# Patient Record
Sex: MALE | Race: WHITE | NOT HISPANIC OR LATINO | URBAN - METROPOLITAN AREA
[De-identification: names, ages, dates, MRNs, and addresses within clinical notes are randomized per-mention and may not be internally consistent; named-entity substitution may affect disease eponyms.]

---

## 2018-05-24 ENCOUNTER — APPOINTMENT (OUTPATIENT)
Dept: URBAN - METROPOLITAN AREA CLINIC 200 | Age: 63
Setting detail: DERMATOLOGY
End: 2018-06-05

## 2018-05-24 DIAGNOSIS — L57.0 ACTINIC KERATOSIS: ICD-10-CM

## 2018-05-24 PROCEDURE — OTHER LIQUID NITROGEN: OTHER

## 2018-05-24 PROCEDURE — 17000 DESTRUCT PREMALG LESION: CPT

## 2018-05-24 ASSESSMENT — LOCATION DETAILED DESCRIPTION DERM: LOCATION DETAILED: NASAL DORSUM

## 2018-05-24 ASSESSMENT — LOCATION SIMPLE DESCRIPTION DERM: LOCATION SIMPLE: NOSE

## 2018-05-24 ASSESSMENT — PAIN INTENSITY VAS: HOW INTENSE IS YOUR PAIN 0 BEING NO PAIN, 10 BEING THE MOST SEVERE PAIN POSSIBLE?: NO PAIN

## 2018-05-24 ASSESSMENT — LOCATION ZONE DERM: LOCATION ZONE: NOSE

## 2018-07-30 ENCOUNTER — TELEPHONE (OUTPATIENT)
Dept: FAMILY MEDICINE | Facility: CLINIC | Age: 63
End: 2018-07-30

## 2018-08-29 ENCOUNTER — APPOINTMENT (OUTPATIENT)
Dept: URBAN - METROPOLITAN AREA CLINIC 200 | Age: 63
Setting detail: DERMATOLOGY
End: 2018-09-11

## 2018-08-29 DIAGNOSIS — L57.0 ACTINIC KERATOSIS: ICD-10-CM

## 2018-08-29 PROCEDURE — 17000 DESTRUCT PREMALG LESION: CPT

## 2018-08-29 PROCEDURE — OTHER LIQUID NITROGEN: OTHER

## 2018-08-29 ASSESSMENT — LOCATION DETAILED DESCRIPTION DERM: LOCATION DETAILED: NASAL DORSUM

## 2018-08-29 ASSESSMENT — LOCATION ZONE DERM: LOCATION ZONE: NOSE

## 2018-08-29 ASSESSMENT — PAIN INTENSITY VAS: HOW INTENSE IS YOUR PAIN 0 BEING NO PAIN, 10 BEING THE MOST SEVERE PAIN POSSIBLE?: NO PAIN

## 2018-08-29 ASSESSMENT — LOCATION SIMPLE DESCRIPTION DERM: LOCATION SIMPLE: NOSE

## 2018-11-19 ENCOUNTER — APPOINTMENT (OUTPATIENT)
Dept: URBAN - METROPOLITAN AREA CLINIC 200 | Age: 63
Setting detail: DERMATOLOGY
End: 2018-11-20

## 2018-11-19 DIAGNOSIS — L91.8 OTHER HYPERTROPHIC DISORDERS OF THE SKIN: ICD-10-CM

## 2018-11-19 DIAGNOSIS — L57.8 OTHER SKIN CHANGES DUE TO CHRONIC EXPOSURE TO NONIONIZING RADIATION: ICD-10-CM

## 2018-11-19 PROCEDURE — 99213 OFFICE O/P EST LOW 20 MIN: CPT

## 2018-11-19 PROCEDURE — OTHER MIPS QUALITY: OTHER

## 2018-11-19 PROCEDURE — OTHER COUNSELING: OTHER

## 2018-11-19 ASSESSMENT — LOCATION ZONE DERM
LOCATION ZONE: TRUNK
LOCATION ZONE: AXILLAE

## 2018-11-19 ASSESSMENT — LOCATION SIMPLE DESCRIPTION DERM
LOCATION SIMPLE: CHEST
LOCATION SIMPLE: LEFT AXILLARY VAULT

## 2018-11-19 ASSESSMENT — LOCATION DETAILED DESCRIPTION DERM
LOCATION DETAILED: LEFT MEDIAL SUPERIOR CHEST
LOCATION DETAILED: LEFT AXILLARY VAULT

## 2018-11-30 ENCOUNTER — HOSPITAL ENCOUNTER (OUTPATIENT)
Dept: CARDIOLOGY | Facility: HOSPITAL | Age: 63
Discharge: HOME | End: 2018-11-30
Attending: ORTHOPAEDIC SURGERY
Payer: COMMERCIAL

## 2018-11-30 ENCOUNTER — TRANSCRIBE ORDERS (OUTPATIENT)
Dept: CARDIOLOGY | Facility: HOSPITAL | Age: 63
End: 2018-11-30

## 2018-11-30 ENCOUNTER — APPOINTMENT (OUTPATIENT)
Dept: LAB | Facility: HOSPITAL | Age: 63
End: 2018-11-30
Attending: ORTHOPAEDIC SURGERY
Payer: COMMERCIAL

## 2018-11-30 ENCOUNTER — TRANSCRIBE ORDERS (OUTPATIENT)
Dept: LAB | Facility: HOSPITAL | Age: 63
End: 2018-11-30

## 2018-11-30 DIAGNOSIS — M66.312 SPONTANEOUS RUPTURE OF FLEXOR TENDON OF LEFT SHOULDER: ICD-10-CM

## 2018-11-30 DIAGNOSIS — Z01.818 ENCOUNTER FOR OTHER PREPROCEDURAL EXAMINATION: Primary | ICD-10-CM

## 2018-11-30 DIAGNOSIS — Z01.818 ENCOUNTER FOR OTHER PREPROCEDURAL EXAMINATION: ICD-10-CM

## 2018-11-30 DIAGNOSIS — M66.312 SPONTANEOUS RUPTURE OF FLEXOR TENDON OF LEFT SHOULDER: Primary | ICD-10-CM

## 2018-11-30 LAB
ANION GAP SERPL CALC-SCNC: 11 MEQ/L (ref 3–15)
BUN SERPL-MCNC: 12 MG/DL (ref 8–20)
CALCIUM SERPL-MCNC: 9.7 MG/DL (ref 8.9–10.3)
CHLORIDE SERPL-SCNC: 101 MEQ/L (ref 98–109)
CO2 SERPL-SCNC: 27 MEQ/L (ref 22–32)
CREAT SERPL-MCNC: 0.8 MG/DL
ERYTHROCYTE [DISTWIDTH] IN BLOOD BY AUTOMATED COUNT: 13 % (ref 11.6–14.4)
GFR SERPL CREATININE-BSD FRML MDRD: >60 ML/MIN/1.73M*2
GLUCOSE SERPL-MCNC: 101 MG/DL (ref 70–99)
HCT VFR BLDCO AUTO: 41.3 %
HGB BLD-MCNC: 14 G/DL
MCH RBC QN AUTO: 31.6 PG (ref 28–33.2)
MCHC RBC AUTO-ENTMCNC: 33.9 G/DL (ref 32.2–36.5)
MCV RBC AUTO: 93.2 FL (ref 83–98)
PDW BLD AUTO: 9 FL (ref 9.4–12.4)
PLATELET # BLD AUTO: 322 K/UL
POTASSIUM SERPL-SCNC: 4.5 MEQ/L (ref 3.6–5.1)
RBC # BLD AUTO: 4.43 M/UL (ref 4.5–5.8)
SODIUM SERPL-SCNC: 139 MEQ/L (ref 136–144)
WBC # BLD AUTO: 7.15 K/UL

## 2018-11-30 PROCEDURE — 80048 BASIC METABOLIC PNL TOTAL CA: CPT

## 2018-11-30 PROCEDURE — 36415 COLL VENOUS BLD VENIPUNCTURE: CPT

## 2018-11-30 PROCEDURE — 85027 COMPLETE CBC AUTOMATED: CPT

## 2018-11-30 PROCEDURE — 93005 ELECTROCARDIOGRAM TRACING: CPT

## 2018-12-01 LAB
ATRIAL RATE: 61
P AXIS: 45
PR INTERVAL: 158
QRS DURATION: 110
QT INTERVAL: 400
QTC CALCULATION(BAZETT): 402
R AXIS: -43
T WAVE AXIS: 39
VENTRICULAR RATE: 61

## 2018-12-01 PROCEDURE — 93010 ELECTROCARDIOGRAM REPORT: CPT | Performed by: INTERNAL MEDICINE

## 2018-12-09 PROBLEM — Z87.442 HISTORY OF KIDNEY STONES: Status: ACTIVE | Noted: 2017-05-18

## 2018-12-09 PROBLEM — K21.9 GASTROESOPHAGEAL REFLUX DISEASE: Status: ACTIVE | Noted: 2018-12-09

## 2018-12-09 PROBLEM — F90.0 ADHD (ATTENTION DEFICIT HYPERACTIVITY DISORDER), INATTENTIVE TYPE: Status: ACTIVE | Noted: 2018-12-09

## 2018-12-09 PROBLEM — E78.00 PURE HYPERCHOLESTEROLEMIA: Status: ACTIVE | Noted: 2018-12-09

## 2018-12-10 ENCOUNTER — TELEPHONE (OUTPATIENT)
Dept: FAMILY MEDICINE | Facility: CLINIC | Age: 63
End: 2018-12-10

## 2018-12-10 ENCOUNTER — CONSULT (OUTPATIENT)
Dept: FAMILY MEDICINE | Facility: CLINIC | Age: 63
End: 2018-12-10
Payer: COMMERCIAL

## 2018-12-10 VITALS
BODY MASS INDEX: 23.23 KG/M2 | WEIGHT: 148 LBS | HEIGHT: 67 IN | DIASTOLIC BLOOD PRESSURE: 70 MMHG | RESPIRATION RATE: 12 BRPM | HEART RATE: 74 BPM | SYSTOLIC BLOOD PRESSURE: 110 MMHG

## 2018-12-10 DIAGNOSIS — Z01.818 PREOPERATIVE EXAMINATION: Primary | ICD-10-CM

## 2018-12-10 DIAGNOSIS — I44.4 LEFT ANTERIOR FASCICULAR BLOCK: ICD-10-CM

## 2018-12-10 DIAGNOSIS — F90.0 ADHD (ATTENTION DEFICIT HYPERACTIVITY DISORDER), INATTENTIVE TYPE: ICD-10-CM

## 2018-12-10 DIAGNOSIS — E78.00 PURE HYPERCHOLESTEROLEMIA: ICD-10-CM

## 2018-12-10 DIAGNOSIS — S46.219A BICEPS TENDON TEAR: ICD-10-CM

## 2018-12-10 PROCEDURE — 99243 OFF/OP CNSLTJ NEW/EST LOW 30: CPT | Performed by: FAMILY MEDICINE

## 2018-12-10 RX ORDER — ALPRAZOLAM 0.25 MG/1
TABLET ORAL
COMMUNITY
Start: 2018-11-06

## 2018-12-10 RX ORDER — METHYLPHENIDATE HYDROCHLORIDE 10 MG/1
TABLET ORAL
COMMUNITY
Start: 2018-11-06 | End: 2021-08-31

## 2018-12-10 RX ORDER — ZOLPIDEM TARTRATE 10 MG/1
10 TABLET ORAL
COMMUNITY
Start: 2018-11-06 | End: 2021-08-31

## 2018-12-10 RX ORDER — TADALAFIL 20 MG/1
TABLET, FILM COATED ORAL
COMMUNITY
Start: 2018-11-02

## 2018-12-10 ASSESSMENT — ENCOUNTER SYMPTOMS
NAUSEA: 0
ABDOMINAL PAIN: 0
NERVOUS/ANXIOUS: 0
APNEA: 0
CHEST TIGHTNESS: 0
BLOOD IN STOOL: 0
FEVER: 0
VOMITING: 0
SLEEP DISTURBANCE: 0
LIGHT-HEADEDNESS: 0
CHILLS: 0
SHORTNESS OF BREATH: 0
DIZZINESS: 0
PALPITATIONS: 0

## 2018-12-10 NOTE — ASSESSMENT & PLAN NOTE
-EKG reviewed and essentially unchanged from 9/2017  -Only manifestation is leftward axis at -43 (-47 last year)  -Likely benign consequence of aging

## 2018-12-10 NOTE — PROGRESS NOTES
"Subjective      Patient ID: Juan Crespo is a 63 y.o. male.  1955      Danny is here for preoperative assessment    - Bicep Tendon Rupture L shoulder s/p injury 6 wks ago   - 12/14/18 with Dr Dunn  - [facility]   - NKDA, NKFA  - prior surgeries: Cartiva on R great toe, tonsillectomy, and L inguinal hernia repair in 20s- no prior anaesthesia reactions  -Only family hx of CAD is mother and father with MI and stents respectively in their 80s  -Denies any chest pain, shortness of breath, dizziness, syncope, vomiting, diaphoresis in the setting of 30-60 minutes of circuit training prior to onset of above sx.   - had EKG (unchanged) and normal BMP and CBC last wk         The following have been reviewed and updated as appropriate in this visit:  Tobacco  Med Hx  Surg Hx  Fam Hx  Soc Hx      Review of Systems   Constitutional: Negative for chills and fever.   Respiratory: Negative for apnea, chest tightness and shortness of breath.         Denies snoring   Cardiovascular: Negative for chest pain, palpitations and leg swelling.   Gastrointestinal: Negative for abdominal pain, blood in stool, nausea and vomiting.   Musculoskeletal: Negative for gait problem.   Skin: Negative for rash.   Neurological: Negative for dizziness, syncope and light-headedness.   Psychiatric/Behavioral: Negative for sleep disturbance (on prn ambien 10 mg). The patient is not nervous/anxious (controlled per psychiatrist).        Objective     Vitals:    12/10/18 1536 12/10/18 1603   BP: 130/72 110/70   BP Location:  Left upper arm   Patient Position:  Sitting   Pulse: 74    Resp: 12    Weight: 67.1 kg (148 lb)    Height: 1.689 m (5' 6.5\")      Body mass index is 23.53 kg/m².    Physical Exam   Constitutional: He is oriented to person, place, and time. He appears well-developed and well-nourished. No distress.   HENT:   Head: Normocephalic and atraumatic.   Mouth/Throat: Oropharynx is clear and moist. No oropharyngeal exudate. " "  Eyes: Pupils are equal, round, and reactive to light. No scleral icterus.   Cardiovascular: Normal rate, regular rhythm and normal heart sounds.  Exam reveals no gallop and no friction rub.    No murmur heard.  Pulmonary/Chest: Effort normal and breath sounds normal. No respiratory distress. He has no wheezes. He has no rales.   Abdominal: Soft. Bowel sounds are normal. He exhibits no distension and no mass. There is no tenderness. There is no rebound and no guarding.   Musculoskeletal: He exhibits no edema.   \"Parish\" deformity of left anterior proximal bicep upon flexion.   Neurological: He is alert and oriented to person, place, and time.   Skin: He is not diaphoretic.   Psychiatric: He has a normal mood and affect. His behavior is normal. Judgment and thought content normal.   Pleasant and conversational       Assessment/Plan   Problem List Items Addressed This Visit        Other    ADHD (attention deficit hyperactivity disorder), inattentive type     -Management per psychiatrist         Relevant Medications    ALPRAZolam (XANAX) 0.25 mg tablet    methylphenidate HCl (RITALIN) 10 mg tablet    zolpidem (AMBIEN) 10 mg tablet    Pure hypercholesterolemia     -CVD risk from last year <7.5%  -We will repeat lipids fasting and calculate CVD risk at next visit in 1 month  -No need to check perioperatively given lack of other risk factors         Left anterior fascicular block     -EKG reviewed and essentially unchanged from 9/2017  -Only manifestation is leftward axis at -43 (-47 last year)  -Likely benign consequence of aging           Other Visit Diagnoses     Preoperative examination    -  Primary    No contraindication to planned surgery in 4d as scheduled  CBC, BMP wnl & EKG unchanged  No sx of RADHA  Anaesthesiologist to be aware of prn ambien/bzd use    Biceps tendon tear              "

## 2018-12-10 NOTE — ASSESSMENT & PLAN NOTE
-CVD risk from last year <7.5%  -We will repeat lipids fasting and calculate CVD risk at next visit in 1 month  -No need to check perioperatively given lack of other risk factors

## 2018-12-10 NOTE — PATIENT INSTRUCTIONS
Good luck with the surgery as scheduled on Friday.  I see no contraindication to surgery as planned.  Return in 1 month for fasting labs, including cholesterol, and then 1 week later for wellness visit.  At that time, we will help facilitate repeat colonoscopy.

## 2018-12-12 ENCOUNTER — TELEPHONE (OUTPATIENT)
Dept: FAMILY MEDICINE | Facility: CLINIC | Age: 63
End: 2018-12-12

## 2018-12-21 ENCOUNTER — TELEPHONE (OUTPATIENT)
Dept: FAMILY MEDICINE | Facility: CLINIC | Age: 63
End: 2018-12-21

## 2018-12-21 DIAGNOSIS — F90.0 ADHD (ATTENTION DEFICIT HYPERACTIVITY DISORDER), INATTENTIVE TYPE: ICD-10-CM

## 2018-12-21 DIAGNOSIS — Z12.5 SCREENING FOR PROSTATE CANCER: ICD-10-CM

## 2018-12-21 DIAGNOSIS — E78.00 PURE HYPERCHOLESTEROLEMIA: Primary | ICD-10-CM

## 2018-12-26 NOTE — TELEPHONE ENCOUNTER
Stacy,  Please call patient see message below. He was one that was on diagnostic and is going to lab. He is aware.

## 2019-01-29 ENCOUNTER — TELEPHONE (OUTPATIENT)
Dept: FAMILY MEDICINE | Facility: CLINIC | Age: 64
End: 2019-01-29

## 2019-01-29 LAB
ALBUMIN SERPL-MCNC: 4.4 G/DL (ref 3.6–5.1)
ALBUMIN/GLOB SERPL: 2 (CALC) (ref 1–2.5)
ALP SERPL-CCNC: 53 U/L (ref 40–115)
ALT SERPL-CCNC: 12 U/L (ref 9–46)
AST SERPL-CCNC: 15 U/L (ref 10–35)
BILIRUB SERPL-MCNC: 0.6 MG/DL (ref 0.2–1.2)
BUN SERPL-MCNC: 16 MG/DL (ref 7–25)
BUN/CREAT SERPL: ABNORMAL (CALC) (ref 6–22)
CALCIUM SERPL-MCNC: 9.2 MG/DL (ref 8.6–10.3)
CHLORIDE SERPL-SCNC: 105 MMOL/L (ref 98–110)
CHOLEST SERPL-MCNC: 221 MG/DL
CHOLEST/HDLC SERPL: 2.7 (CALC)
CO2 SERPL-SCNC: 27 MMOL/L (ref 20–32)
CREAT SERPL-MCNC: 0.84 MG/DL (ref 0.7–1.25)
GLOBULIN SER CALC-MCNC: 2.2 G/DL (CALC) (ref 1.9–3.7)
GLUCOSE SERPL-MCNC: 105 MG/DL (ref 65–99)
HDLC SERPL-MCNC: 81 MG/DL
LDLC SERPL CALC-MCNC: 126 MG/DL (CALC)
NONHDLC SERPL-MCNC: 140 MG/DL (CALC)
POTASSIUM SERPL-SCNC: 4 MMOL/L (ref 3.5–5.3)
PROT SERPL-MCNC: 6.6 G/DL (ref 6.1–8.1)
PSA SERPL-MCNC: 1.3 NG/ML
QUEST EGFR NON-AFR. AMERICAN: 94 ML/MIN/1.73M2
SODIUM SERPL-SCNC: 141 MMOL/L (ref 135–146)
TRIGL SERPL-MCNC: 50 MG/DL
TSH SERPL-ACNC: 0.85 MIU/L (ref 0.4–4.5)

## 2019-01-29 NOTE — TELEPHONE ENCOUNTER
CVD risk borderline at 6.2%- < ACC cut off of 7.5% and USPSTF cutoff of 10% for statin for primary prevention   - will discuss at visit 2/4/19

## 2019-02-04 ENCOUNTER — OFFICE VISIT (OUTPATIENT)
Dept: FAMILY MEDICINE | Facility: CLINIC | Age: 64
End: 2019-02-04
Payer: COMMERCIAL

## 2019-02-04 VITALS
TEMPERATURE: 99.3 F | BODY MASS INDEX: 23.2 KG/M2 | RESPIRATION RATE: 16 BRPM | WEIGHT: 147.8 LBS | SYSTOLIC BLOOD PRESSURE: 120 MMHG | DIASTOLIC BLOOD PRESSURE: 78 MMHG | HEART RATE: 84 BPM | HEIGHT: 67 IN

## 2019-02-04 DIAGNOSIS — R73.01 ELEVATED FASTING BLOOD SUGAR: ICD-10-CM

## 2019-02-04 DIAGNOSIS — E78.00 PURE HYPERCHOLESTEROLEMIA: ICD-10-CM

## 2019-02-04 DIAGNOSIS — R39.198 SLOW URINARY STREAM: ICD-10-CM

## 2019-02-04 DIAGNOSIS — F41.0 PANIC DISORDER: ICD-10-CM

## 2019-02-04 DIAGNOSIS — F90.0 ADHD (ATTENTION DEFICIT HYPERACTIVITY DISORDER), INATTENTIVE TYPE: ICD-10-CM

## 2019-02-04 DIAGNOSIS — R00.2 INTERMITTENT PALPITATIONS: ICD-10-CM

## 2019-02-04 DIAGNOSIS — Z12.5 SCREENING FOR PROSTATE CANCER: ICD-10-CM

## 2019-02-04 DIAGNOSIS — Z87.442 HISTORY OF KIDNEY STONES: ICD-10-CM

## 2019-02-04 DIAGNOSIS — Z12.11 SCREEN FOR COLON CANCER: ICD-10-CM

## 2019-02-04 DIAGNOSIS — Z00.00 PREVENTATIVE HEALTH CARE: Primary | ICD-10-CM

## 2019-02-04 PROCEDURE — 99396 PREV VISIT EST AGE 40-64: CPT | Performed by: FAMILY MEDICINE

## 2019-02-04 ASSESSMENT — ENCOUNTER SYMPTOMS
NERVOUS/ANXIOUS: 1
FEVER: 0
FATIGUE: 0
BLOOD IN STOOL: 0
PALPITATIONS: 1
ANAL BLEEDING: 0
DYSPHORIC MOOD: 0
SLEEP DISTURBANCE: 1
UNEXPECTED WEIGHT CHANGE: 0
CHEST TIGHTNESS: 0
NAUSEA: 0
SHORTNESS OF BREATH: 0
DYSURIA: 0
NUMBNESS: 0
LIGHT-HEADEDNESS: 0
APPETITE CHANGE: 0
HEMATURIA: 0
HEADACHES: 0
CHILLS: 0
HALLUCINATIONS: 0
VOMITING: 0
WHEEZING: 0
DIZZINESS: 0
ABDOMINAL PAIN: 0
DIARRHEA: 0
WEAKNESS: 0
DECREASED CONCENTRATION: 0
CONSTIPATION: 0

## 2019-02-04 NOTE — PATIENT INSTRUCTIONS
Please work on obtaining at least 150 minutes of cardiovascular exercise per week.   Eat 5 servings of fruits and vegetables per day.    Dentist every 6 months.  We will let you know once new Shingles vaccines, Shingrix, is available.   Please schedule colonoscopy with Dr Moore. They will contact you within next 2 weeks. Let me know if not hearing back from them within that time.     Please see Dr Naqvi as soon as possible to discuss your current anxiety and stress.   Please consider seeing therapist to help learn relaxation techniques and mindfulness.  Call him immediately for any worsening anxiety in the meantime.    I suspect palpitations are due to anxiety, but stay well-hydrated with plenty of fluids, work to reduce caffeine intake, and see cardiologist for further assessment within the next few weeks.    Please see Urologist, Dr Garcia, in next 1-2 months for annual prostate exam and kidney stone follow up.

## 2019-02-04 NOTE — ASSESSMENT & PLAN NOTE
- using prn stimulant, which I advised him to avoid until palpitation workup complete.  He agrees to do so.

## 2019-02-04 NOTE — ASSESSMENT & PLAN NOTE
"- single sensation of \"skipped beat\" likely PVCs 2/2 concurrent anxiety  - TSH, lytes, CBC wnl   - nonexertional, chronic, and completely resolved with Xanax x1  - recent EKG (while sx were occurring) few mos ago unchanged from prior  - hydrate, avoid stimulant for now, reduce caffeine, and see Cardio in timely fashion (he requests referral and is eager to have this worked up)    "

## 2019-02-04 NOTE — ASSESSMENT & PLAN NOTE
- minimal at 105  - admits to frequent sugary candies  -Reduce carbohydrates, sweets, and candies and repeat 1 year

## 2019-02-04 NOTE — ASSESSMENT & PLAN NOTE
-Under significant social stress with work, but particularly family issues, his son recently admitted to inpatient psych and going through legal troubles  -He is already established with a psychiatrist, but has not discussed his recent struggles with Dr. Naqvi  -He will call Psych asap, establish with therapist, and take time to do things he enjoys

## 2019-02-04 NOTE — ASSESSMENT & PLAN NOTE
- CVD risk <7.5%, so no statin indicated for primary prevention  - although could consider Coronary Ca score to risk stratify, he will be seeing Cardio in near future, so defer recommendation to Dr Estevez

## 2019-02-04 NOTE — ASSESSMENT & PLAN NOTE
- PSA wnl  - due for annual prostate exam through Uro-> discussed importance with Danny and he agrees to schedule  - BPH sx remain stable over past yr-> defer tx options to Urology as well

## 2019-02-04 NOTE — PROGRESS NOTES
"Subjective      Patient ID: Juan Crespo is a 63 y.o. male.  1955      Complaints:  Changes to Medical Hx:  - Recent labs: , , KPO=027. CVD risk=6.5%. TSH, CBC, CMP wnl. PSA 1.3.     Diet: well balanced. eats a lot of carbs. denies sugary beverages or frequent sweets.   Exercise: not yet lifting s/p L shoulder surgery. Plans to restart CV exercise. Taking \"tons of steps at work\"- no issue   T: denies   EtOH: socially   D: denies   FHx of Cancer: denies crc or prostate ca.   FHx of CVD: M with stent at 86. F with stent in 80s.   Lives with: wife and 2 children   Work: [employer]   Sexual Activity: monogamous   Colonoscopy: 2008 - [facility] - due for repeat     Zosta: s/p zostavax. Interested in being added to Shingrix list.   Td/TDap: 2013  Had flu shot at work.   CMP/LP: UTD  HepC: UTD        The following have been reviewed and updated as appropriate in this visit:  Tobacco  Allergies  Meds  Problems  Med Hx  Surg Hx  Fam Hx       Review of Systems   Constitutional: Negative for appetite change, chills, fatigue, fever and unexpected weight change.   HENT: Negative for hearing loss and tinnitus.    Eyes: Negative for visual disturbance.   Respiratory: Negative for chest tightness, shortness of breath and wheezing.    Cardiovascular: Positive for palpitations (random, nonexertional, and few days per week. ). Negative for chest pain and leg swelling.   Gastrointestinal: Negative for abdominal pain, anal bleeding, blood in stool, constipation, diarrhea, nausea and vomiting.        No gerd issues   Genitourinary: Negative for dysuria and hematuria.        Denies nocturia.  + slowed stream and incomplete emptying unchanged for 1 year. Saw Dr Garcia 1 yr ago and given Alfuzosin, which helped urinary sx greatly, but caused decreased ejaculate production, which bothered him   Skin: Negative for rash.   Neurological: Negative for dizziness, syncope, weakness, light-headedness, numbness and " "headaches.   Psychiatric/Behavioral: Positive for sleep disturbance. Negative for decreased concentration, dysphoric mood, hallucinations, self-injury and suicidal ideas. The patient is nervous/anxious.         Son recently with some legal troubles causing heightened anxiety. Intermittent. During episodes, heart feels it is racing for mins, but no cp, dizziness, lh, or sob. Resolves with xanax. Denies depression, SI/HI. Insomnia intermittently even on Ambien 5-10 mg per night.   No visit with Psychiatrist.        Objective     Vitals:    02/04/19 0835 02/04/19 0919   BP: 132/86 120/78   BP Location: Left upper arm Left upper arm   Patient Position: Sitting Sitting   Pulse: 99 84   Resp: 16    Temp: 37.4 °C (99.3 °F)    TempSrc: Oral    Weight: 67 kg (147 lb 12.8 oz)    Height: 1.689 m (5' 6.5\")      Body mass index is 23.5 kg/m².    Physical Exam   Constitutional: He is oriented to person, place, and time. He appears well-developed and well-nourished. No distress.   HENT:   Head: Normocephalic and atraumatic.   Right Ear: Hearing, tympanic membrane, external ear and ear canal normal.   Left Ear: Hearing, tympanic membrane, external ear and ear canal normal.   Mouth/Throat: Oropharynx is clear and moist and mucous membranes are normal. No oropharyngeal exudate.   Eyes: EOM are normal. Pupils are equal, round, and reactive to light. No scleral icterus.   Neck: No thyromegaly present.   Cardiovascular: Normal rate, regular rhythm and normal heart sounds.  Exam reveals no gallop and no friction rub.    No murmur heard.  Pulmonary/Chest: Effort normal and breath sounds normal. No respiratory distress. He has no wheezes. He has no rales.   Abdominal: Soft. Bowel sounds are normal. He exhibits no distension and no mass. There is no tenderness. There is no rebound and no guarding. No hernia.   Musculoskeletal: He exhibits no edema.   Lymphadenopathy:     He has no cervical adenopathy.   Neurological: He is alert and " "oriented to person, place, and time. No cranial nerve deficit.   Skin: He is not diaphoretic.   Psychiatric: His behavior is normal. Judgment and thought content normal. His mood appears not anxious.   Anxious affect noted       Assessment/Plan   Problem List Items Addressed This Visit        Other    ADHD (attention deficit hyperactivity disorder), inattentive type     - using prn stimulant, which I advised him to avoid until palpitation workup complete.  He agrees to do so.         Relevant Orders    Ambulatory referral to Psychology    History of kidney stones     - asx  - overdue for annual f/u with Dr Garcia- encouraged appointment         Relevant Orders    Ambulatory referral to Urology    Pure hypercholesterolemia     - CVD risk <7.5%, so no statin indicated for primary prevention  - although could consider Coronary Ca score to risk stratify, he will be seeing Cardio in near future, so defer recommendation to Dr Estevez         Relevant Orders    Ambulatory referral to Cardiology    Screening for prostate cancer     - PSA wnl  - due for annual prostate exam through Uro-> discussed importance with Danny and he agrees to schedule  - BPH sx remain stable over past yr-> defer tx options to Urology as well         Relevant Orders    Ambulatory referral to Urology    Elevated fasting blood sugar     - minimal at 105  - admits to frequent sugary candies  -Reduce carbohydrates, sweets, and candies and repeat 1 year         Panic disorder     -Under significant social stress with work, but particularly family issues, his son recently admitted to inpatient psych and going through legal troubles  -He is already established with a psychiatrist, but has not discussed his recent struggles with Dr. Naqvi  -He will call Psych asap, establish with therapist, and take time to do things he enjoys         Relevant Orders    Ambulatory referral to Psychology    Intermittent palpitations     - single sensation of \"skipped beat\" " likely PVCs 2/2 concurrent anxiety  - TSH, lytes, CBC wnl   - nonexertional, chronic, and completely resolved with Xanax x1  - recent EKG (while sx were occurring) few mos ago unchanged from prior  - hydrate, avoid stimulant for now, reduce caffeine, and see Cardio in timely fashion (he requests referral and is eager to have this worked up)           Relevant Orders    Ambulatory referral to Cardiology      Other Visit Diagnoses     Preventative health care    -  Primary    Healthy lifestyle practices in setting of HLD  UTD vaccines  Discussed risks/benefits of shingrix- agrees and added to list    Screen for colon cancer        - overdue for Q 10 yr cscope- direct access form completed. expressed importance    Relevant Orders    Direct Access Colonoscopy [facility]    Slow urinary stream        Relevant Orders    Ambulatory referral to Urology

## 2019-03-12 ENCOUNTER — TRANSCRIBE ORDERS (OUTPATIENT)
Dept: SCHEDULING | Age: 64
End: 2019-03-12

## 2019-03-12 DIAGNOSIS — R00.2 PALPITATIONS: ICD-10-CM

## 2019-03-12 DIAGNOSIS — E78.2 MIXED HYPERLIPIDEMIA: ICD-10-CM

## 2019-03-12 DIAGNOSIS — R94.31 ABNORMAL ELECTROCARDIOGRAM: Primary | ICD-10-CM

## 2019-03-21 ENCOUNTER — TELEPHONE (OUTPATIENT)
Dept: FAMILY MEDICINE | Facility: CLINIC | Age: 64
End: 2019-03-21

## 2019-03-21 NOTE — TELEPHONE ENCOUNTER
Pt called in for 2 things, to make sure he is on the shingrix list, and he has a question about going to [place] on 3/30, does he need any shots or anything?

## 2019-03-22 NOTE — TELEPHONE ENCOUNTER
Called pt and infod him of message below.he will ck with the CDC.. He did this several yrs ago . So he will find out what he needs and call us back

## 2019-03-22 NOTE — TELEPHONE ENCOUNTER
Please let him know that he is on the Shingrix list.     For his trip to [place] he can check the CDC travel website for any suggested vaccinations to start, and set up an appointment with Dr. JEROME for travel meds if he needs further consultation.

## 2019-03-23 PROBLEM — I34.0 MODERATE MITRAL REGURGITATION BY PRIOR ECHOCARDIOGRAM: Status: ACTIVE | Noted: 2019-03-23

## 2019-03-23 PROBLEM — I77.810 AORTIC ROOT DILATATION (CMS/HCC): Status: ACTIVE | Noted: 2019-03-23

## 2019-03-27 ENCOUNTER — HOSPITAL ENCOUNTER (OUTPATIENT)
Dept: RADIOLOGY | Facility: HOSPITAL | Age: 64
Discharge: HOME | End: 2019-03-27
Attending: INTERNAL MEDICINE

## 2019-03-27 DIAGNOSIS — R00.2 PALPITATIONS: ICD-10-CM

## 2019-03-27 DIAGNOSIS — R94.31 ABNORMAL ELECTROCARDIOGRAM: ICD-10-CM

## 2019-03-27 DIAGNOSIS — E78.2 MIXED HYPERLIPIDEMIA: ICD-10-CM

## 2019-03-27 PROCEDURE — 75571 CT HRT W/O DYE W/CA TEST: CPT

## 2019-04-22 PROBLEM — R93.1 AGATSTON CORONARY ARTERY CALCIUM SCORE LESS THAN 100: Status: ACTIVE | Noted: 2019-04-22

## 2019-05-23 ENCOUNTER — TELEPHONE (OUTPATIENT)
Dept: FAMILY MEDICINE | Facility: CLINIC | Age: 64
End: 2019-05-23

## 2019-05-23 NOTE — TELEPHONE ENCOUNTER
JODI spoke with pt earlier today, regarding form from Stephanie for Pre Op Cardiac Clearance.  Pt stated he was going to schedule this through Dr. Estevez's office.     I faxed form to [phone].

## 2019-06-25 ENCOUNTER — APPOINTMENT (OUTPATIENT)
Dept: LAB | Facility: HOSPITAL | Age: 64
End: 2019-06-25
Attending: ORTHOPAEDIC SURGERY
Payer: COMMERCIAL

## 2019-06-25 ENCOUNTER — TRANSCRIBE ORDERS (OUTPATIENT)
Dept: REGISTRATION | Facility: HOSPITAL | Age: 64
End: 2019-06-25

## 2019-06-25 DIAGNOSIS — M79.672 PAIN OF LEFT FOOT: ICD-10-CM

## 2019-06-25 DIAGNOSIS — M19.079 PRIMARY OSTEOARTHRITIS, UNSPECIFIED ANKLE AND FOOT: Primary | ICD-10-CM

## 2019-06-25 DIAGNOSIS — Z01.818 ENCOUNTER FOR OTHER PREPROCEDURAL EXAMINATION: ICD-10-CM

## 2019-06-25 DIAGNOSIS — M19.079 PRIMARY OSTEOARTHRITIS, UNSPECIFIED ANKLE AND FOOT: ICD-10-CM

## 2019-06-25 LAB
ANION GAP SERPL CALC-SCNC: 7 MEQ/L (ref 3–15)
BUN SERPL-MCNC: 13 MG/DL (ref 8–20)
CALCIUM SERPL-MCNC: 9.4 MG/DL (ref 8.9–10.3)
CHLORIDE SERPL-SCNC: 106 MEQ/L (ref 98–109)
CO2 SERPL-SCNC: 25 MEQ/L (ref 22–32)
CREAT SERPL-MCNC: 0.8 MG/DL
ERYTHROCYTE [DISTWIDTH] IN BLOOD BY AUTOMATED COUNT: 12.7 % (ref 11.6–14.4)
GFR SERPL CREATININE-BSD FRML MDRD: >60 ML/MIN/1.73M*2
GLUCOSE SERPL-MCNC: 101 MG/DL (ref 70–99)
HCT VFR BLDCO AUTO: 41.9 %
HGB BLD-MCNC: 14.3 G/DL
MCH RBC QN AUTO: 31.7 PG (ref 28–33.2)
MCHC RBC AUTO-ENTMCNC: 34.1 G/DL (ref 32.2–36.5)
MCV RBC AUTO: 92.9 FL (ref 83–98)
PDW BLD AUTO: 9.4 FL (ref 9.4–12.4)
PLATELET # BLD AUTO: 317 K/UL
POTASSIUM SERPL-SCNC: 4.1 MEQ/L (ref 3.6–5.1)
RBC # BLD AUTO: 4.51 M/UL (ref 4.5–5.8)
SODIUM SERPL-SCNC: 138 MEQ/L (ref 136–144)
WBC # BLD AUTO: 5.71 K/UL

## 2019-06-25 PROCEDURE — 36415 COLL VENOUS BLD VENIPUNCTURE: CPT

## 2019-06-25 PROCEDURE — 80048 BASIC METABOLIC PNL TOTAL CA: CPT

## 2019-06-25 PROCEDURE — 85027 COMPLETE CBC AUTOMATED: CPT

## 2019-07-15 ENCOUNTER — PATIENT OUTREACH (OUTPATIENT)
Dept: FAMILY MEDICINE | Facility: CLINIC | Age: 64
End: 2019-07-15

## 2019-07-15 NOTE — PROGRESS NOTES
Care Gap Team has outreached to Juan Crespo on behalf of their primary care provider.      Care Gap Source:: DVACO Gap Report         Care Gap Status:: Due    Outreach via:: Telephone    Adult Preventive Wellness Protocol(s) Used: : Colorectal Cancer Screening    Chart Review Completed:: Yes  Patient interview completed:: Yes  Inclusion Criteria:: Met  Exclusion Criteria: None  Patient educated on recommended care:: Yes       Provided order(s) for:: none    Provided clinical referral(s) for:: None    Appointment provided:: No                Addressed patient's questions and concerns.  Patient verbalized an understanding of the plan and is without additional questions or concerns at this time. Patient states that he will schedule an appointment within the next month or two. Patient was very appreciative of the reminder call. He has all paperwork needed for GI specialist.

## 2019-12-11 PROBLEM — K62.1 RECTAL POLYP: Status: ACTIVE | Noted: 2019-12-11

## 2019-12-31 ENCOUNTER — TRANSCRIBE ORDERS (OUTPATIENT)
Dept: REGISTRATION | Facility: HOSPITAL | Age: 64
End: 2019-12-31

## 2019-12-31 ENCOUNTER — APPOINTMENT (OUTPATIENT)
Dept: LAB | Facility: HOSPITAL | Age: 64
End: 2019-12-31
Attending: INTERNAL MEDICINE
Payer: COMMERCIAL

## 2019-12-31 DIAGNOSIS — E78.2 MIXED HYPERLIPIDEMIA: ICD-10-CM

## 2019-12-31 DIAGNOSIS — R00.2 PALPITATIONS: ICD-10-CM

## 2019-12-31 DIAGNOSIS — I25.10 ATHEROSCLEROTIC HEART DISEASE OF NATIVE CORONARY ARTERY WITHOUT ANGINA PECTORIS: ICD-10-CM

## 2019-12-31 DIAGNOSIS — I25.10 ATHEROSCLEROTIC HEART DISEASE OF NATIVE CORONARY ARTERY WITHOUT ANGINA PECTORIS: Primary | ICD-10-CM

## 2019-12-31 LAB
ALBUMIN SERPL-MCNC: 3.9 G/DL (ref 3.4–5)
ALP SERPL-CCNC: 50 IU/L (ref 35–126)
ALT SERPL-CCNC: 26 IU/L (ref 16–63)
ANION GAP SERPL CALC-SCNC: 8 MEQ/L (ref 3–15)
AST SERPL-CCNC: 24 IU/L (ref 15–41)
BILIRUB SERPL-MCNC: 0.6 MG/DL (ref 0.3–1.2)
BUN SERPL-MCNC: 13 MG/DL (ref 8–20)
CALCIUM SERPL-MCNC: 9.2 MG/DL (ref 8.9–10.3)
CHLORIDE SERPL-SCNC: 108 MEQ/L (ref 98–109)
CHOLEST SERPL-MCNC: 159 MG/DL
CO2 SERPL-SCNC: 26 MEQ/L (ref 22–32)
CREAT SERPL-MCNC: 0.9 MG/DL
GFR SERPL CREATININE-BSD FRML MDRD: >60 ML/MIN/1.73M*2
GLUCOSE SERPL-MCNC: 103 MG/DL (ref 70–99)
HDLC SERPL-MCNC: 80 MG/DL
HDLC SERPL: 2 {RATIO}
LDLC SERPL CALC-MCNC: 73 MG/DL
NONHDLC SERPL-MCNC: 79 MG/DL
POTASSIUM SERPL-SCNC: 3.7 MEQ/L (ref 3.6–5.1)
PROT SERPL-MCNC: 6.5 G/DL (ref 6–8.2)
SODIUM SERPL-SCNC: 142 MEQ/L (ref 136–144)
TRIGL SERPL-MCNC: 29 MG/DL (ref 30–149)

## 2019-12-31 PROCEDURE — 80061 LIPID PANEL: CPT

## 2019-12-31 PROCEDURE — 80053 COMPREHEN METABOLIC PANEL: CPT

## 2019-12-31 PROCEDURE — 36415 COLL VENOUS BLD VENIPUNCTURE: CPT

## 2020-01-29 ENCOUNTER — APPOINTMENT (OUTPATIENT)
Dept: URBAN - METROPOLITAN AREA CLINIC 200 | Age: 65
Setting detail: DERMATOLOGY
End: 2020-02-07

## 2020-01-29 DIAGNOSIS — L57.8 OTHER SKIN CHANGES DUE TO CHRONIC EXPOSURE TO NONIONIZING RADIATION: ICD-10-CM

## 2020-01-29 PROCEDURE — OTHER COUNSELING: OTHER

## 2020-01-29 PROCEDURE — 99214 OFFICE O/P EST MOD 30 MIN: CPT

## 2020-01-29 PROCEDURE — OTHER MIPS QUALITY: OTHER

## 2020-01-29 ASSESSMENT — LOCATION SIMPLE DESCRIPTION DERM: LOCATION SIMPLE: CHEST

## 2020-01-29 ASSESSMENT — LOCATION ZONE DERM: LOCATION ZONE: TRUNK

## 2020-01-29 ASSESSMENT — LOCATION DETAILED DESCRIPTION DERM: LOCATION DETAILED: LEFT MEDIAL SUPERIOR CHEST

## 2020-08-10 ENCOUNTER — TRANSCRIBE ORDERS (OUTPATIENT)
Dept: SCHEDULING | Age: 65
End: 2020-08-10

## 2020-08-10 DIAGNOSIS — M24.812 OTHER SPECIFIC JOINT DERANGEMENTS OF LEFT SHOULDER, NOT ELSEWHERE CLASSIFIED: Primary | ICD-10-CM

## 2020-08-14 ENCOUNTER — HOSPITAL ENCOUNTER (OUTPATIENT)
Dept: RADIOLOGY | Facility: HOSPITAL | Age: 65
Discharge: HOME | End: 2020-08-14
Attending: ORTHOPAEDIC SURGERY
Payer: COMMERCIAL

## 2020-08-14 DIAGNOSIS — M24.812 OTHER SPECIFIC JOINT DERANGEMENTS OF LEFT SHOULDER, NOT ELSEWHERE CLASSIFIED: ICD-10-CM

## 2020-09-02 ENCOUNTER — TELEPHONE (OUTPATIENT)
Dept: PRIMARY CARE | Facility: CLINIC | Age: 65
End: 2020-09-02

## 2020-09-02 DIAGNOSIS — R73.01 ELEVATED FASTING BLOOD SUGAR: Primary | ICD-10-CM

## 2020-09-02 DIAGNOSIS — F41.0 PANIC DISORDER: ICD-10-CM

## 2020-09-02 DIAGNOSIS — Z12.5 SCREENING FOR PROSTATE CANCER: ICD-10-CM

## 2020-09-02 DIAGNOSIS — R93.1 AGATSTON CORONARY ARTERY CALCIUM SCORE LESS THAN 100: ICD-10-CM

## 2020-09-02 NOTE — TELEPHONE ENCOUNTER
Patient is scheduled for his yearly physical on October 9th with Dr JEROME.  He would like to get his bloodwork done before his appt.  Can doctor JEROME send lab orders to [facility], patient will be going to [facility].    Patient would like to be notified when order is in.

## 2020-09-02 NOTE — TELEPHONE ENCOUNTER
Would you please order labs prior to pt's PE on 10/9/20. I will notify pt after labs are ordered. Thank you

## 2020-09-14 ENCOUNTER — APPOINTMENT (OUTPATIENT)
Dept: LAB | Facility: HOSPITAL | Age: 65
End: 2020-09-14
Attending: FAMILY MEDICINE
Payer: COMMERCIAL

## 2020-09-14 DIAGNOSIS — F41.0 PANIC DISORDER: ICD-10-CM

## 2020-09-14 DIAGNOSIS — Z12.5 SCREENING FOR PROSTATE CANCER: ICD-10-CM

## 2020-09-14 DIAGNOSIS — R93.1 AGATSTON CORONARY ARTERY CALCIUM SCORE LESS THAN 100: ICD-10-CM

## 2020-09-14 DIAGNOSIS — R73.01 ELEVATED FASTING BLOOD SUGAR: ICD-10-CM

## 2020-09-14 LAB
ALBUMIN SERPL-MCNC: 4.2 G/DL (ref 3.4–5)
ALP SERPL-CCNC: 51 IU/L (ref 35–126)
ALT SERPL-CCNC: 23 IU/L (ref 16–63)
ANION GAP SERPL CALC-SCNC: 10 MEQ/L (ref 3–15)
AST SERPL-CCNC: 20 IU/L (ref 15–41)
BILIRUB SERPL-MCNC: 1 MG/DL (ref 0.3–1.2)
BUN SERPL-MCNC: 16 MG/DL (ref 8–20)
CALCIUM SERPL-MCNC: 9.7 MG/DL (ref 8.9–10.3)
CHLORIDE SERPL-SCNC: 106 MEQ/L (ref 98–109)
CHOLEST SERPL-MCNC: 155 MG/DL
CO2 SERPL-SCNC: 25 MEQ/L (ref 22–32)
CREAT SERPL-MCNC: 0.8 MG/DL (ref 0.8–1.3)
ERYTHROCYTE [DISTWIDTH] IN BLOOD BY AUTOMATED COUNT: 13.2 % (ref 11.6–14.4)
EST. AVERAGE GLUCOSE BLD GHB EST-MCNC: 111 MG/DL
GFR SERPL CREATININE-BSD FRML MDRD: >60 ML/MIN/1.73M*2
GLUCOSE SERPL-MCNC: 92 MG/DL (ref 70–99)
HBA1C MFR BLD HPLC: 5.5 %
HCT VFR BLDCO AUTO: 39.8 % (ref 40.1–51)
HDLC SERPL-MCNC: 76 MG/DL
HDLC SERPL: 2 {RATIO}
HGB BLD-MCNC: 13.6 G/DL (ref 13.7–17.5)
LDLC SERPL CALC-MCNC: 71 MG/DL
MCH RBC QN AUTO: 31.6 PG (ref 28–33.2)
MCHC RBC AUTO-ENTMCNC: 34.2 G/DL (ref 32.2–36.5)
MCV RBC AUTO: 92.6 FL (ref 83–98)
NONHDLC SERPL-MCNC: 79 MG/DL
PDW BLD AUTO: 9.2 FL (ref 9.4–12.4)
PLATELET # BLD AUTO: 313 K/UL (ref 150–350)
POTASSIUM SERPL-SCNC: 4.3 MEQ/L (ref 3.6–5.1)
PROT SERPL-MCNC: 6.3 G/DL (ref 6–8.2)
PSA SERPL-MCNC: 1.22 NG/ML
RBC # BLD AUTO: 4.3 M/UL (ref 4.5–5.8)
SODIUM SERPL-SCNC: 141 MEQ/L (ref 136–144)
TRIGL SERPL-MCNC: 39 MG/DL (ref 30–149)
TSH SERPL DL<=0.05 MIU/L-ACNC: 1.2 MIU/L (ref 0.34–5.6)
WBC # BLD AUTO: 5.77 K/UL (ref 3.8–10.5)

## 2020-09-14 PROCEDURE — 85027 COMPLETE CBC AUTOMATED: CPT

## 2020-09-14 PROCEDURE — 80053 COMPREHEN METABOLIC PANEL: CPT

## 2020-09-14 PROCEDURE — 80061 LIPID PANEL: CPT

## 2020-09-14 PROCEDURE — 36415 COLL VENOUS BLD VENIPUNCTURE: CPT

## 2020-09-14 PROCEDURE — 83036 HEMOGLOBIN GLYCOSYLATED A1C: CPT

## 2020-09-14 PROCEDURE — 84153 ASSAY OF PSA TOTAL: CPT

## 2020-09-14 PROCEDURE — 84443 ASSAY THYROID STIM HORMONE: CPT

## 2020-10-09 ENCOUNTER — OFFICE VISIT (OUTPATIENT)
Dept: PRIMARY CARE | Facility: CLINIC | Age: 65
End: 2020-10-09
Payer: COMMERCIAL

## 2020-10-09 VITALS
WEIGHT: 146.6 LBS | TEMPERATURE: 97 F | DIASTOLIC BLOOD PRESSURE: 78 MMHG | SYSTOLIC BLOOD PRESSURE: 126 MMHG | HEART RATE: 86 BPM | HEIGHT: 66 IN | BODY MASS INDEX: 23.56 KG/M2

## 2020-10-09 DIAGNOSIS — E78.00 PURE HYPERCHOLESTEROLEMIA: ICD-10-CM

## 2020-10-09 DIAGNOSIS — K62.1 RECTAL POLYP: ICD-10-CM

## 2020-10-09 DIAGNOSIS — F90.0 ADHD (ATTENTION DEFICIT HYPERACTIVITY DISORDER), INATTENTIVE TYPE: ICD-10-CM

## 2020-10-09 DIAGNOSIS — I34.0 MODERATE MITRAL REGURGITATION BY PRIOR ECHOCARDIOGRAM: ICD-10-CM

## 2020-10-09 DIAGNOSIS — R39.12 BENIGN PROSTATIC HYPERPLASIA WITH WEAK URINARY STREAM: ICD-10-CM

## 2020-10-09 DIAGNOSIS — F41.0 PANIC DISORDER: ICD-10-CM

## 2020-10-09 DIAGNOSIS — I77.810 AORTIC ROOT DILATATION (CMS/HCC): ICD-10-CM

## 2020-10-09 DIAGNOSIS — D64.9 NORMOCYTIC ANEMIA: ICD-10-CM

## 2020-10-09 DIAGNOSIS — R93.1 AGATSTON CORONARY ARTERY CALCIUM SCORE LESS THAN 100: ICD-10-CM

## 2020-10-09 DIAGNOSIS — Z87.442 HISTORY OF KIDNEY STONES: ICD-10-CM

## 2020-10-09 DIAGNOSIS — Z12.5 SCREENING FOR PROSTATE CANCER: ICD-10-CM

## 2020-10-09 DIAGNOSIS — R73.01 ELEVATED FASTING BLOOD SUGAR: ICD-10-CM

## 2020-10-09 DIAGNOSIS — Z00.00 PREVENTATIVE HEALTH CARE: Primary | ICD-10-CM

## 2020-10-09 DIAGNOSIS — K58.1 IRRITABLE BOWEL SYNDROME WITH CONSTIPATION: ICD-10-CM

## 2020-10-09 DIAGNOSIS — N40.1 BENIGN PROSTATIC HYPERPLASIA WITH WEAK URINARY STREAM: ICD-10-CM

## 2020-10-09 DIAGNOSIS — Z23 NEED FOR IMMUNIZATION AGAINST INFLUENZA: ICD-10-CM

## 2020-10-09 PROCEDURE — 90471 IMMUNIZATION ADMIN: CPT | Performed by: FAMILY MEDICINE

## 2020-10-09 PROCEDURE — 99396 PREV VISIT EST AGE 40-64: CPT | Mod: 25 | Performed by: FAMILY MEDICINE

## 2020-10-09 PROCEDURE — 90686 IIV4 VACC NO PRSV 0.5 ML IM: CPT | Performed by: FAMILY MEDICINE

## 2020-10-09 RX ORDER — ATORVASTATIN CALCIUM 20 MG/1
TABLET, FILM COATED ORAL
COMMUNITY
Start: 2020-08-17

## 2020-10-09 RX ORDER — PROPRANOLOL HYDROCHLORIDE 10 MG/1
TABLET ORAL
COMMUNITY
Start: 2020-07-27 | End: 2021-08-31

## 2020-10-09 RX ORDER — ZOSTER VACCINE RECOMBINANT, ADJUVANTED 50 MCG/0.5
50 KIT INTRAMUSCULAR ONCE
Qty: 0.5 ML | Refills: 1 | Status: SHIPPED | OUTPATIENT
Start: 2020-10-09 | End: 2020-10-09

## 2020-10-09 ASSESSMENT — ENCOUNTER SYMPTOMS
VOMITING: 0
ABDOMINAL PAIN: 0
CONSTIPATION: 1
BLOOD IN STOOL: 0
ANAL BLEEDING: 0
FEVER: 0
WEAKNESS: 0
DIAPHORESIS: 0
PALPITATIONS: 0
NAUSEA: 0
WHEEZING: 0
DYSPHORIC MOOD: 0
FATIGUE: 0
NUMBNESS: 0
DIZZINESS: 0
UNEXPECTED WEIGHT CHANGE: 0
APPETITE CHANGE: 0
CHILLS: 0
NERVOUS/ANXIOUS: 1
LIGHT-HEADEDNESS: 0
SHORTNESS OF BREATH: 0
DIARRHEA: 0
CHEST TIGHTNESS: 0
HEMATURIA: 0
HEADACHES: 0
SLEEP DISTURBANCE: 1

## 2020-10-09 NOTE — ASSESSMENT & PLAN NOTE
-Symptoms remain stable over time  -His PSA is also stable  -He is asking for a different urologist to establish with so referral was placed

## 2020-10-09 NOTE — ASSESSMENT & PLAN NOTE
-Danny saw his cardiologist in February and he was recommended a 2-year repeat to follow both his aortic root dilatation and mitral regurgitation  -He will get echocardiogram in February when he sees cardiologist

## 2020-10-09 NOTE — PATIENT INSTRUCTIONS
Flu shot today.  Please get the new shingles vaccine, Shingrix, at the pharmacy for 2 dose series  by 2 to 6 months.  Please have nonfasting labs repeated to look at your blood count again.  Please add daily Benefiber supplement and consider a probiotic, like Align, once daily.  Continue to see her psychiatrist for the anxiety and insomnia and consider switching to trazodone.  Please discuss that with him.  Otherwise, please establish with a new urologist, Dr. Dial.   Continue to see cardiologist annually     VACCINE INFORMATION STATEMENT     Influenza (Flu) Vaccine (Inactivated or Recombinant): What you need to know     Many Vaccine Information Statements are available in [place]ic and other languages. See  www.immunize.org/vis    Hojas de información sobre vacunas están disponibles en español y en muchos otrosidiomas. Visite www.immunize.org/vis     1. Why Get Vaccinated?  Influenza vaccine can prevent influenza (flu).  Flu is a contagious disease that spreads around the [place] every year, usually between October and May. Anyone can get the flu, but it is more dangerous for some people. Infants and young children, people 65 years of age and older, pregnant women, and people with certain health conditions or a weakened immune system are at greatest risk of flu complications.  Pneumonia, bronchitis, sinus infections and ear infections are examples of flu-related complications. If you have a medical condition, such as heart disease, cancer or diabetes, flu can make it worse.  Flu can cause fever and chills, sore throat, muscle aches, fatigue, cough, headache, and runny or stuffy nose. Some people may have vomiting and diarrhea, though this is more common in children than adults.  Each year thousands of people in the [place] die from flu, and many more are hospitalized. Flu vaccine prevents millions of illnesses and flu-related visits to the doctor each year.    2. Influenza vaccine  CDC  recommends everyone 6 months of age and older get vaccinated every flu season. Children  6 months through 8 years of age may need 2 doses during a single flu season. Everyone else needs only 1 dose each flu season.  It takes about 2 weeks for protection to develop after vaccination.  There are many flu viruses, and they are always changing. Each year a new flu vaccine is made to protect against three or four viruses that are likely to cause disease in the upcoming flu season. Even when the vaccine doesn't exactly match these viruses, it may still provide some protection.  Influenza vaccine does not cause flu.  Influenza vaccine may be given at the same time as other vaccines.    3. Talk with your health care provider   Tell your vaccine provider if the person getting the vaccine:  Has had an allergic reaction after a previous dose of influenza vaccine, or has any severe, life- threatening allergies.  Has ever had Guillain-Barré Syndrome (also called GBS).  In some cases, your health care provider may decide to postpone influenza vaccination to a future visit.  People with minor illnesses, such as a cold, may be vaccinated. People who are moderately or severely ill should usually wait until they recover before getting influenza vaccine.  Your health care provider can give you more information.    4. Risks of vaccine reaction  Soreness, redness, and swelling where shot is given, fever, muscle aches, and headache can happen after influenza vaccine.  There may be a very small increased risk of Guillain-Barré Syndrome (GBS) after inactivated influenza vaccine (the flu shot).      Young children who get the flu shot along with pneumococcal vaccine (PCV13), and/or DTaP vaccine at the same time might be slightly more likely to have a seizure caused by fever. Tell your health care provider if a child who is getting flu vaccine has ever had a seizure.  People sometimes faint after medical procedures, including vaccination.  Tell your provider if you feel dizzy or have vision changes or ringing in the ears.  As with any medicine, there is a very remote chance of a vaccine causing a severe allergic reaction, other serious injury, or death.     5. What if there is a serious problem?  An allergic reaction could occur after the vaccinated person leaves the clinic. If you see signs of a  severe allergic reaction (hives, swelling of the face and throat, difficulty breathing, a fast heartbeat, dizziness, or weakness), call 9-1-1 and get the person to the nearest hospital.  For other signs that concern you, call your health care provider.  Adverse reactions should be reported to the Vaccine Adverse Event Reporting System (VAERS). Your health care provider will usually file this report, or you can do it yourself. Visit the VAERS website at www.vaers.hhs.gov or call 1-412.383.6720. VAERS is only for reporting reactions, and VAERS staff do not give medical advice.    6. The National Vaccine Injury Compensation Program  The National Vaccine Injury Compensation Program (VICP) is a federal program that was created to compensate people who may have been injured by certain vaccines. Visit the VICP website at www.hrsa.gov/vaccinecompensation or call 1-571.514.1674 to learn about the program and about filing a claim. There is a time limit to file a claim for compensation.    7. How can I learn more?  Ask your healthcare provider.  Call your local or state health department.  Contact the Centers for Disease Control and Prevention (CDC):  - Call 1-429.850.8026 (1-363-IUW-INFO) or  - Visit CDC's www.cdc.gov/flu       U.S. Department of  Health and Human Services  Centers for Disease Control and Prevention  Vaccine Information Statement (Interim)  Inactivated Influenza Vaccine  8/15/2019  42 U.S.C. § 300aa-26

## 2020-10-09 NOTE — PROGRESS NOTES
"      Subjective      Patient ID: Juan Crespo is a 65 y.o. male.  1955      Complaints:  Changes to Medical Hx:  - Requiring Ambien 10 mg nightly through Psychiatry      Diet: well balanced. Plenty of fruits/veggies. Fish regularly. denies sugary beverages or frequent sweets.   Exercise: walking 4 miles, few days/wk- no sob, cp, dizziness, lightheadedness.   T: denies   EtOH: socially   D: denies   FHx of Cancer: denies crc or prostate ca.   FHx of CVD: M with stent at 86. F with stent in 80s. MGM with heart dz in 80s.   Lives with: wife and daughter  Work: [employer]   Sexual Activity: monogamous   Colonoscopy: 12/2019 with hyperplastic polyp only in the rectum     Zosta: s/p zostavax. Interested  Td/TDap: 2013  Had flu shot at work.   CMP/LP: Most recent labs show a normal CMP, lipids, A1c, and TSH.  Hemoglobin slightly low at 13.6 with MCV of 92.6.  PSA 1.22.   HepC: UTD      The following have been reviewed and updated as appropriate in this visit:  Allergies  Meds  Med Hx  Surg Hx       Review of Systems   Constitutional: Negative for appetite change, chills, diaphoresis, fatigue, fever and unexpected weight change.   HENT: Negative for hearing loss and tinnitus.    Eyes: Negative for visual disturbance.   Respiratory: Negative for chest tightness, shortness of breath and wheezing.         Denies nocturnal choking/gasping, snoring, or daytime fatigue or nonrestful sleep.    Cardiovascular: Negative for chest pain, palpitations and leg swelling.   Gastrointestinal: Positive for constipation (\"slight tendency for years\". controlled with stool softener and 2 cups of grapefruit qd). Negative for abdominal pain, anal bleeding, blood in stool, diarrhea, nausea and vomiting.   Genitourinary: Negative for hematuria.        Denies nocturia. Intermittently with a weak stream. Unchanged compared to last yr.    Skin: Negative for rash.   Neurological: Negative for dizziness, syncope, weakness, " "light-headedness, numbness and headaches.   Psychiatric/Behavioral: Positive for sleep disturbance (though well controlled with ambien). Negative for dysphoric mood. The patient is nervous/anxious (intermittent, but at baseline).        Objective     Vitals:    10/09/20 1306 10/09/20 1341   BP: 130/80 126/78   BP Location: Left upper arm Left upper arm   Patient Position: Sitting Sitting   Pulse: 86    Temp: 36.1 °C (97 °F)    Weight: 66.5 kg (146 lb 9.6 oz)    Height: 1.676 m (5' 6\")      Body mass index is 23.66 kg/m².    Physical Exam  Constitutional:       General: He is not in acute distress.     Appearance: Normal appearance. He is well-developed and normal weight. He is not ill-appearing, toxic-appearing or diaphoretic.   HENT:      Head: Normocephalic and atraumatic.      Right Ear: Hearing, tympanic membrane, ear canal and external ear normal.      Left Ear: Hearing, tympanic membrane, ear canal and external ear normal.      Mouth/Throat:      Mouth: Mucous membranes are moist.      Pharynx: Oropharynx is clear.   Eyes:      General: No scleral icterus.     Extraocular Movements: Extraocular movements intact.      Pupils: Pupils are equal, round, and reactive to light.   Neck:      Thyroid: No thyromegaly.   Cardiovascular:      Rate and Rhythm: Normal rate and regular rhythm.      Heart sounds: Normal heart sounds. No murmur. No friction rub. No gallop.    Pulmonary:      Effort: Pulmonary effort is normal. No respiratory distress.      Breath sounds: Normal breath sounds. No wheezing or rales.   Abdominal:      General: Bowel sounds are normal. There is no distension.      Palpations: Abdomen is soft. There is no mass.      Tenderness: There is no abdominal tenderness. There is no guarding or rebound.      Hernia: No hernia is present.   Genitourinary:     Comments: Defer JAVIER to Urologist, to whom I referred pt  Musculoskeletal:      Right lower leg: No edema.      Left lower leg: No edema. "   Lymphadenopathy:      Cervical: No cervical adenopathy.   Neurological:      Mental Status: He is alert and oriented to person, place, and time. Mental status is at baseline.      Cranial Nerves: No cranial nerve deficit.   Psychiatric:         Mood and Affect: Mood is not anxious.         Behavior: Behavior normal.         Thought Content: Thought content normal.         Judgment: Judgment normal.      Comments: Anxious affect at baseline         Assessment/Plan   Diagnoses and all orders for this visit:    Preventative health care (Primary)  Comments:  Healthy lifestyle practices in setting of comorbidities  Discussed risks/benefits of shingrix- he agrees to 2 dose series at pharm    Pure hypercholesterolemia  Assessment & Plan:  -Most recent lipids excellent in the setting of coronary calcium score  -He will continue moderate intensity statin, which he is tolerating well  -Remains without symptoms from a cardiopulmonary perspective      Agatston coronary artery calcium score less than 100    Aortic root dilatation (CMS/HCC)  Assessment & Plan:  -Danny saw his cardiologist in February and he was recommended a 2-year repeat to follow both his aortic root dilatation and mitral regurgitation  -He will get echocardiogram in February when he sees cardiologist      ADHD (attention deficit hyperactivity disorder), inattentive type  Assessment & Plan:  -Rarely takes Ritalin through his psychiatrist as needed      Moderate mitral regurgitation by prior echocardiogram    Benign prostatic hyperplasia with weak urinary stream  Assessment & Plan:  -Symptoms remain stable over time  -His PSA is also stable  -He is asking for a different urologist to establish with so referral was placed    Orders:  -     Ambulatory referral to Urology; Future    Elevated fasting blood sugar  Assessment & Plan:  -Normalized on most recent labs, which is great      Rectal polyp  Assessment & Plan:  -Hyperplastic only, so repeat 10  "years      Normocytic anemia  Assessment & Plan:  -Minimally low on most recent labs and he is without symptoms  -We will trend with iron studies and B vitamins    Orders:  -     CBC and Differential; Future  -     Iron and TIBC; Future  -     Ferritin; Future  -     Vitamin B12; Future  -     Folate; Future    History of kidney stones  -     Ambulatory referral to Urology; Future    Irritable bowel syndrome with constipation  Assessment & Plan:  -He has \"tended towards constipation\" since he was in his 20s, but reports he controls this well with 2 cups of grapefruit daily and stool softener  -I like that he is incorporating lots of fruits and vegetables in his diet, but I also recommended hydration and considering switching out the stool softener for fiber supplement and probiotic  -His symptoms are stable and he is up-to-date with his colonoscopy      Screening for prostate cancer    Panic disorder  Assessment & Plan:  -Management through psychiatrist  -We discussed the long-term sequela of Ambien he will discuss potentially switching to trazodone with his psychiatrist to help with nocturnal anxiety that interferes with sleep  -No symptoms of RADHA      Need for immunization against influenza  -     Influenza vaccine quadrivalent preservative free 6 month and older IM    Other orders  -     varicella-zoster [id number], PF, (SHINGRIX, PF,) 50 mcg/0.5 mL suspension for reconstitution injection; Inject 0.5 mL (50 mcg total) into the shoulder, thigh, or buttocks once for 1 dose. #2 in 2-6 mos after #1      "

## 2020-10-09 NOTE — ASSESSMENT & PLAN NOTE
"-He has \"tended towards constipation\" since he was in his 20s, but reports he controls this well with 2 cups of grapefruit daily and stool softener  -I like that he is incorporating lots of fruits and vegetables in his diet, but I also recommended hydration and considering switching out the stool softener for fiber supplement and probiotic  -His symptoms are stable and he is up-to-date with his colonoscopy  "

## 2020-10-09 NOTE — ASSESSMENT & PLAN NOTE
-Management through psychiatrist  -We discussed the long-term sequela of Ambien he will discuss potentially switching to trazodone with his psychiatrist to help with nocturnal anxiety that interferes with sleep  -No symptoms of RADHA    used

## 2020-10-09 NOTE — ASSESSMENT & PLAN NOTE
-Most recent lipids excellent in the setting of coronary calcium score  -He will continue moderate intensity statin, which he is tolerating well  -Remains without symptoms from a cardiopulmonary perspective

## 2020-10-09 NOTE — ASSESSMENT & PLAN NOTE
-Minimally low on most recent labs and he is without symptoms  -We will trend with iron studies and B vitamins

## 2020-12-01 ENCOUNTER — APPOINTMENT (OUTPATIENT)
Dept: LAB | Facility: HOSPITAL | Age: 65
End: 2020-12-01
Attending: FAMILY MEDICINE
Payer: COMMERCIAL

## 2020-12-01 DIAGNOSIS — D64.9 NORMOCYTIC ANEMIA: ICD-10-CM

## 2020-12-01 LAB
BASOPHILS # BLD: 0.05 K/UL (ref 0.01–0.1)
BASOPHILS NFR BLD: 0.7 %
DIFFERENTIAL METHOD BLD: ABNORMAL
EOSINOPHIL # BLD: 0.16 K/UL (ref 0.04–0.54)
EOSINOPHIL NFR BLD: 2.2 %
ERYTHROCYTE [DISTWIDTH] IN BLOOD BY AUTOMATED COUNT: 13.2 % (ref 11.6–14.4)
FERRITIN SERPL-MCNC: 234 NG/ML (ref 24–250)
FOLATE SERPL-MCNC: >20 NG/ML
HCT VFR BLDCO AUTO: 40.4 % (ref 40.1–51)
HGB BLD-MCNC: 13.5 G/DL (ref 13.7–17.5)
IMM GRANULOCYTES # BLD AUTO: 0.02 K/UL (ref 0–0.08)
IMM GRANULOCYTES NFR BLD AUTO: 0.3 %
IRON SATN MFR SERPL: 27 % (ref 15–45)
IRON SERPL-MCNC: 87 UG/DL (ref 35–150)
LYMPHOCYTES # BLD: 2.41 K/UL (ref 1.2–3.5)
LYMPHOCYTES NFR BLD: 32.7 %
MCH RBC QN AUTO: 31.5 PG (ref 28–33.2)
MCHC RBC AUTO-ENTMCNC: 33.4 G/DL (ref 32.2–36.5)
MCV RBC AUTO: 94.4 FL (ref 83–98)
MONOCYTES # BLD: 0.56 K/UL (ref 0.3–1)
MONOCYTES NFR BLD: 7.6 %
NEUTROPHILS # BLD: 4.16 K/UL (ref 1.7–7)
NEUTS SEG NFR BLD: 56.5 %
NRBC BLD-RTO: 0 %
PDW BLD AUTO: 9.5 FL (ref 9.4–12.4)
PLATELET # BLD AUTO: 320 K/UL (ref 150–350)
RBC # BLD AUTO: 4.28 M/UL (ref 4.5–5.8)
TIBC SERPL-MCNC: 321 UG/DL (ref 270–460)
UIBC SERPL-MCNC: 234 UG/DL (ref 180–360)
VIT B12 SERPL-MCNC: 263 PG/ML (ref 180–914)
WBC # BLD AUTO: 7.36 K/UL (ref 3.8–10.5)

## 2020-12-01 PROCEDURE — 82607 VITAMIN B-12: CPT

## 2020-12-01 PROCEDURE — 82746 ASSAY OF FOLIC ACID SERUM: CPT

## 2020-12-01 PROCEDURE — 85025 COMPLETE CBC W/AUTO DIFF WBC: CPT

## 2020-12-01 PROCEDURE — 36415 COLL VENOUS BLD VENIPUNCTURE: CPT

## 2020-12-01 PROCEDURE — 82728 ASSAY OF FERRITIN: CPT

## 2020-12-01 PROCEDURE — 83540 ASSAY OF IRON: CPT

## 2020-12-02 ENCOUNTER — TELEPHONE (OUTPATIENT)
Dept: PRIMARY CARE | Facility: CLINIC | Age: 65
End: 2020-12-02

## 2020-12-02 DIAGNOSIS — D64.9 NORMOCYTIC ANEMIA: Primary | ICD-10-CM

## 2020-12-02 RX ORDER — LANOLIN ALCOHOL/MO/W.PET/CERES
1000 CREAM (GRAM) TOPICAL DAILY
Qty: 90 TABLET | Refills: 1 | Status: SHIPPED | OUTPATIENT
Start: 2020-12-02 | End: 2022-08-16

## 2020-12-02 NOTE — TELEPHONE ENCOUNTER
Spoke with Danny regarding lab results  -Hemoglobin essentially unchanged only minimally low.  His B12 level though is <300, so we will add vitamin B12 1000 mcg daily and have him repeat in 2 to 3 months.  Reassuring that his iron studies are normal.  Folate also normal.  -He will see me thereafter to review the results and also to discuss potentially transitioning his medications from his psychiatrist to me, though he understands that if he is going to continue taking Ritalin, he should continue to see the psychiatrist.  He has weaned off of Ambien and switch to trazodone and feels this is working well.

## 2021-02-22 ENCOUNTER — APPOINTMENT (OUTPATIENT)
Dept: URBAN - METROPOLITAN AREA CLINIC 200 | Age: 66
Setting detail: DERMATOLOGY
End: 2021-03-05

## 2021-02-22 DIAGNOSIS — L57.8 OTHER SKIN CHANGES DUE TO CHRONIC EXPOSURE TO NONIONIZING RADIATION: ICD-10-CM

## 2021-02-22 DIAGNOSIS — L29.8 OTHER PRURITUS: ICD-10-CM

## 2021-02-22 PROCEDURE — OTHER COUNSELING: OTHER

## 2021-02-22 PROCEDURE — OTHER REASSURANCE: OTHER

## 2021-02-22 PROCEDURE — 99213 OFFICE O/P EST LOW 20 MIN: CPT

## 2021-02-22 PROCEDURE — OTHER PRESCRIPTION: OTHER

## 2021-02-22 PROCEDURE — OTHER MIPS QUALITY: OTHER

## 2021-02-22 RX ORDER — TRIAMCINOLONE ACETONIDE 1 MG/G
CREAM TOPICAL
Qty: 1 | Refills: 5 | Status: ERX | COMMUNITY
Start: 2021-02-22

## 2021-02-22 ASSESSMENT — LOCATION ZONE DERM
LOCATION ZONE: TRUNK
LOCATION ZONE: ARM

## 2021-02-22 ASSESSMENT — LOCATION SIMPLE DESCRIPTION DERM
LOCATION SIMPLE: RIGHT FOREARM
LOCATION SIMPLE: CHEST

## 2021-02-22 ASSESSMENT — LOCATION DETAILED DESCRIPTION DERM
LOCATION DETAILED: RIGHT PROXIMAL DORSAL FOREARM
LOCATION DETAILED: LEFT MEDIAL SUPERIOR CHEST

## 2021-02-23 ENCOUNTER — APPOINTMENT (OUTPATIENT)
Dept: LAB | Facility: HOSPITAL | Age: 66
End: 2021-02-23
Attending: FAMILY MEDICINE
Payer: COMMERCIAL

## 2021-02-23 DIAGNOSIS — D64.9 NORMOCYTIC ANEMIA: ICD-10-CM

## 2021-02-23 LAB
BASOPHILS # BLD: 0.04 K/UL (ref 0.01–0.1)
BASOPHILS NFR BLD: 0.6 %
DIFFERENTIAL METHOD BLD: NORMAL
EOSINOPHIL # BLD: 0.18 K/UL (ref 0.04–0.54)
EOSINOPHIL NFR BLD: 2.5 %
ERYTHROCYTE [DISTWIDTH] IN BLOOD BY AUTOMATED COUNT: 13 % (ref 11.6–14.4)
HCT VFR BLDCO AUTO: 42.6 % (ref 40.1–51)
HGB BLD-MCNC: 14 G/DL (ref 13.7–17.5)
IMM GRANULOCYTES # BLD AUTO: 0.02 K/UL (ref 0–0.08)
IMM GRANULOCYTES NFR BLD AUTO: 0.3 %
LYMPHOCYTES # BLD: 2.57 K/UL (ref 1.2–3.5)
LYMPHOCYTES NFR BLD: 36.3 %
MCH RBC QN AUTO: 30.9 PG (ref 28–33.2)
MCHC RBC AUTO-ENTMCNC: 32.9 G/DL (ref 32.2–36.5)
MCV RBC AUTO: 94 FL (ref 83–98)
MONOCYTES # BLD: 0.59 K/UL (ref 0.3–1)
MONOCYTES NFR BLD: 8.3 %
NEUTROPHILS # BLD: 3.68 K/UL (ref 1.7–7)
NEUTS SEG NFR BLD: 52 %
NRBC BLD-RTO: 0 %
PDW BLD AUTO: 9.4 FL (ref 9.4–12.4)
PLATELET # BLD AUTO: 335 K/UL (ref 150–350)
RBC # BLD AUTO: 4.53 M/UL (ref 4.5–5.8)
VIT B12 SERPL-MCNC: 667 PG/ML (ref 180–914)
WBC # BLD AUTO: 7.08 K/UL (ref 3.8–10.5)

## 2021-02-23 PROCEDURE — 82607 VITAMIN B-12: CPT

## 2021-02-23 PROCEDURE — 36415 COLL VENOUS BLD VENIPUNCTURE: CPT

## 2021-02-23 PROCEDURE — 85025 COMPLETE CBC W/AUTO DIFF WBC: CPT

## 2021-08-02 ENCOUNTER — TELEPHONE (OUTPATIENT)
Dept: PRIMARY CARE | Facility: CLINIC | Age: 66
End: 2021-08-02

## 2021-08-02 DIAGNOSIS — R39.12 BENIGN PROSTATIC HYPERPLASIA WITH WEAK URINARY STREAM: ICD-10-CM

## 2021-08-02 DIAGNOSIS — N40.1 BENIGN PROSTATIC HYPERPLASIA WITH WEAK URINARY STREAM: ICD-10-CM

## 2021-08-02 DIAGNOSIS — D64.9 NORMOCYTIC ANEMIA: ICD-10-CM

## 2021-08-02 DIAGNOSIS — R73.01 ELEVATED FASTING BLOOD SUGAR: ICD-10-CM

## 2021-08-02 DIAGNOSIS — F41.0 PANIC DISORDER: ICD-10-CM

## 2021-08-02 DIAGNOSIS — E78.00 PURE HYPERCHOLESTEROLEMIA: Primary | ICD-10-CM

## 2021-08-02 NOTE — TELEPHONE ENCOUNTER
He has a PE with Dr JEROME 9/14/21 and would like a labslip sent to [facility] so he can get done before PE

## 2021-08-31 ENCOUNTER — OFFICE VISIT (OUTPATIENT)
Dept: PRIMARY CARE | Facility: CLINIC | Age: 66
End: 2021-08-31
Payer: COMMERCIAL

## 2021-08-31 VITALS
DIASTOLIC BLOOD PRESSURE: 86 MMHG | HEIGHT: 66 IN | WEIGHT: 147 LBS | SYSTOLIC BLOOD PRESSURE: 136 MMHG | BODY MASS INDEX: 23.63 KG/M2 | TEMPERATURE: 97.8 F | HEART RATE: 64 BPM | OXYGEN SATURATION: 99 % | RESPIRATION RATE: 12 BRPM

## 2021-08-31 DIAGNOSIS — N45.1 EPIDIDYMITIS, RIGHT: Primary | ICD-10-CM

## 2021-08-31 PROCEDURE — 99213 OFFICE O/P EST LOW 20 MIN: CPT | Performed by: FAMILY MEDICINE

## 2021-08-31 PROCEDURE — 3008F BODY MASS INDEX DOCD: CPT | Performed by: FAMILY MEDICINE

## 2021-08-31 RX ORDER — SULFAMETHOXAZOLE AND TRIMETHOPRIM 800; 160 MG/1; MG/1
1 TABLET ORAL 2 TIMES DAILY
Qty: 14 TABLET | Refills: 0 | Status: SHIPPED | OUTPATIENT
Start: 2021-08-31 | End: 2021-09-14

## 2021-08-31 RX ORDER — TRAZODONE HYDROCHLORIDE 50 MG/1
TABLET ORAL
COMMUNITY
Start: 2021-08-06 | End: 2022-07-11

## 2021-09-02 LAB — BACTERIA UR CULT: NORMAL

## 2021-09-09 ENCOUNTER — APPOINTMENT (OUTPATIENT)
Dept: LAB | Facility: HOSPITAL | Age: 66
End: 2021-09-09
Attending: FAMILY MEDICINE
Payer: COMMERCIAL

## 2021-09-09 DIAGNOSIS — D64.9 NORMOCYTIC ANEMIA: ICD-10-CM

## 2021-09-09 DIAGNOSIS — R39.12 BENIGN PROSTATIC HYPERPLASIA WITH WEAK URINARY STREAM: ICD-10-CM

## 2021-09-09 DIAGNOSIS — E78.00 PURE HYPERCHOLESTEROLEMIA: ICD-10-CM

## 2021-09-09 DIAGNOSIS — R73.01 ELEVATED FASTING BLOOD SUGAR: ICD-10-CM

## 2021-09-09 DIAGNOSIS — N40.1 BENIGN PROSTATIC HYPERPLASIA WITH WEAK URINARY STREAM: ICD-10-CM

## 2021-09-09 DIAGNOSIS — F41.0 PANIC DISORDER: ICD-10-CM

## 2021-09-09 LAB
ALBUMIN SERPL-MCNC: 4.1 G/DL (ref 3.4–5)
ALP SERPL-CCNC: 57 IU/L (ref 35–126)
ALT SERPL-CCNC: 30 IU/L (ref 16–63)
ANION GAP SERPL CALC-SCNC: 10 MEQ/L (ref 3–15)
AST SERPL-CCNC: 31 IU/L (ref 15–41)
BASOPHILS # BLD: 0.07 K/UL (ref 0.01–0.1)
BASOPHILS NFR BLD: 1.5 %
BILIRUB SERPL-MCNC: 0.9 MG/DL (ref 0.3–1.2)
BUN SERPL-MCNC: 17 MG/DL (ref 8–20)
CALCIUM SERPL-MCNC: 10.1 MG/DL (ref 8.9–10.3)
CHLORIDE SERPL-SCNC: 105 MEQ/L (ref 98–109)
CHOLEST SERPL-MCNC: 179 MG/DL
CO2 SERPL-SCNC: 25 MEQ/L (ref 22–32)
CREAT SERPL-MCNC: 0.9 MG/DL (ref 0.8–1.3)
DIFFERENTIAL METHOD BLD: ABNORMAL
EOSINOPHIL # BLD: 0.21 K/UL (ref 0.04–0.54)
EOSINOPHIL NFR BLD: 4.5 %
ERYTHROCYTE [DISTWIDTH] IN BLOOD BY AUTOMATED COUNT: 13.5 % (ref 11.6–14.4)
EST. AVERAGE GLUCOSE BLD GHB EST-MCNC: 108 MG/DL
GFR SERPL CREATININE-BSD FRML MDRD: >60 ML/MIN/1.73M*2
GLUCOSE SERPL-MCNC: 100 MG/DL (ref 70–99)
HBA1C MFR BLD HPLC: 5.4 %
HCT VFR BLDCO AUTO: 41.7 % (ref 40.1–51)
HDLC SERPL-MCNC: 102 MG/DL
HDLC SERPL: 1.8 {RATIO}
HGB BLD-MCNC: 13.9 G/DL (ref 13.7–17.5)
IMM GRANULOCYTES # BLD AUTO: 0.02 K/UL (ref 0–0.08)
IMM GRANULOCYTES NFR BLD AUTO: 0.4 %
LDLC SERPL CALC-MCNC: 67 MG/DL
LYMPHOCYTES # BLD: 1.67 K/UL (ref 1.2–3.5)
LYMPHOCYTES NFR BLD: 35.5 %
MCH RBC QN AUTO: 31.3 PG (ref 28–33.2)
MCHC RBC AUTO-ENTMCNC: 33.3 G/DL (ref 32.2–36.5)
MCV RBC AUTO: 93.9 FL (ref 83–98)
MONOCYTES # BLD: 0.39 K/UL (ref 0.3–1)
MONOCYTES NFR BLD: 8.3 %
NEUTROPHILS # BLD: 2.35 K/UL (ref 1.7–7)
NEUTS SEG NFR BLD: 49.8 %
NONHDLC SERPL-MCNC: 77 MG/DL
NRBC BLD-RTO: 0 %
PDW BLD AUTO: 9.3 FL (ref 9.4–12.4)
PLATELET # BLD AUTO: 338 K/UL (ref 150–350)
POTASSIUM SERPL-SCNC: 4.8 MEQ/L (ref 3.6–5.1)
PROT SERPL-MCNC: 6.4 G/DL (ref 6–8.2)
PSA SERPL-MCNC: 1.17 NG/ML
RBC # BLD AUTO: 4.44 M/UL (ref 4.5–5.8)
SODIUM SERPL-SCNC: 140 MEQ/L (ref 136–144)
TRIGL SERPL-MCNC: 51 MG/DL (ref 30–149)
TSH SERPL DL<=0.05 MIU/L-ACNC: 1.09 MIU/L (ref 0.34–5.6)
WBC # BLD AUTO: 4.71 K/UL (ref 3.8–10.5)

## 2021-09-09 PROCEDURE — 36415 COLL VENOUS BLD VENIPUNCTURE: CPT

## 2021-09-09 PROCEDURE — 80061 LIPID PANEL: CPT

## 2021-09-09 PROCEDURE — 84443 ASSAY THYROID STIM HORMONE: CPT

## 2021-09-09 PROCEDURE — 84153 ASSAY OF PSA TOTAL: CPT

## 2021-09-09 PROCEDURE — 80053 COMPREHEN METABOLIC PANEL: CPT

## 2021-09-09 PROCEDURE — 85025 COMPLETE CBC W/AUTO DIFF WBC: CPT

## 2021-09-09 PROCEDURE — 83036 HEMOGLOBIN GLYCOSYLATED A1C: CPT

## 2021-09-14 ENCOUNTER — OFFICE VISIT (OUTPATIENT)
Dept: PRIMARY CARE | Facility: CLINIC | Age: 66
End: 2021-09-14
Payer: COMMERCIAL

## 2021-09-14 VITALS
HEART RATE: 75 BPM | BODY MASS INDEX: 23.2 KG/M2 | TEMPERATURE: 98.7 F | WEIGHT: 147.8 LBS | RESPIRATION RATE: 18 BRPM | DIASTOLIC BLOOD PRESSURE: 80 MMHG | SYSTOLIC BLOOD PRESSURE: 132 MMHG | HEIGHT: 67 IN | OXYGEN SATURATION: 99 %

## 2021-09-14 DIAGNOSIS — R93.1 AGATSTON CORONARY ARTERY CALCIUM SCORE LESS THAN 100: ICD-10-CM

## 2021-09-14 DIAGNOSIS — N45.1 EPIDIDYMITIS, RIGHT: ICD-10-CM

## 2021-09-14 DIAGNOSIS — I34.0 MODERATE MITRAL REGURGITATION BY PRIOR ECHOCARDIOGRAM: ICD-10-CM

## 2021-09-14 DIAGNOSIS — I77.810 AORTIC ROOT DILATATION (CMS/HCC): ICD-10-CM

## 2021-09-14 DIAGNOSIS — Z23 NEED FOR VACCINATION FOR STREP PNEUMONIAE: ICD-10-CM

## 2021-09-14 DIAGNOSIS — E78.00 PURE HYPERCHOLESTEROLEMIA: ICD-10-CM

## 2021-09-14 DIAGNOSIS — Z87.442 HISTORY OF KIDNEY STONES: ICD-10-CM

## 2021-09-14 DIAGNOSIS — R73.01 ELEVATED FASTING BLOOD SUGAR: ICD-10-CM

## 2021-09-14 DIAGNOSIS — Z23 NEED FOR IMMUNIZATION AGAINST INFLUENZA: ICD-10-CM

## 2021-09-14 DIAGNOSIS — Z00.00 PREVENTATIVE HEALTH CARE: Primary | ICD-10-CM

## 2021-09-14 DIAGNOSIS — K58.1 IRRITABLE BOWEL SYNDROME WITH CONSTIPATION: ICD-10-CM

## 2021-09-14 DIAGNOSIS — N40.1 BENIGN PROSTATIC HYPERPLASIA WITH WEAK URINARY STREAM: ICD-10-CM

## 2021-09-14 DIAGNOSIS — R39.12 BENIGN PROSTATIC HYPERPLASIA WITH WEAK URINARY STREAM: ICD-10-CM

## 2021-09-14 PROCEDURE — 90694 VACC AIIV4 NO PRSRV 0.5ML IM: CPT | Performed by: FAMILY MEDICINE

## 2021-09-14 PROCEDURE — 99397 PER PM REEVAL EST PAT 65+ YR: CPT | Mod: 25 | Performed by: FAMILY MEDICINE

## 2021-09-14 PROCEDURE — 90472 IMMUNIZATION ADMIN EACH ADD: CPT | Performed by: FAMILY MEDICINE

## 2021-09-14 PROCEDURE — 90732 PPSV23 VACC 2 YRS+ SUBQ/IM: CPT | Performed by: FAMILY MEDICINE

## 2021-09-14 PROCEDURE — 90471 IMMUNIZATION ADMIN: CPT | Performed by: FAMILY MEDICINE

## 2021-09-14 PROCEDURE — 3008F BODY MASS INDEX DOCD: CPT | Performed by: FAMILY MEDICINE

## 2021-09-14 RX ORDER — SULFAMETHOXAZOLE AND TRIMETHOPRIM 800; 160 MG/1; MG/1
1 TABLET ORAL 2 TIMES DAILY
Qty: 10 TABLET | Refills: 0 | Status: SHIPPED | OUTPATIENT
Start: 2021-09-14 | End: 2021-09-19

## 2021-09-14 ASSESSMENT — ENCOUNTER SYMPTOMS
HEMATURIA: 0
CHEST TIGHTNESS: 0
APPETITE CHANGE: 0
FEVER: 0
CONSTIPATION: 0
UNEXPECTED WEIGHT CHANGE: 0
VOMITING: 0
ABDOMINAL PAIN: 0
DIZZINESS: 0
BLOOD IN STOOL: 0
DIAPHORESIS: 0
NUMBNESS: 0
FATIGUE: 0
WEAKNESS: 0
HEADACHES: 0
CHILLS: 0
DIARRHEA: 0
SLEEP DISTURBANCE: 0
SHORTNESS OF BREATH: 0
FLANK PAIN: 0
DYSPHORIC MOOD: 0
PALPITATIONS: 0
LIGHT-HEADEDNESS: 0
WHEEZING: 0
NAUSEA: 0
NERVOUS/ANXIOUS: 0
ANAL BLEEDING: 0

## 2021-09-14 NOTE — ASSESSMENT & PLAN NOTE
-He has no symptoms whatsoever and will be seeing his urologist in 2 weeks, so defer UA to that time

## 2021-09-14 NOTE — ASSESSMENT & PLAN NOTE
-no sx at all with fiber rich diet, which he will continue  -He is up-to-date on colonoscopy, which showed benign hyperplastic polyps only 2 years ago

## 2021-09-14 NOTE — PROGRESS NOTES
"      Subjective      Patient ID: Juan Crespo is a 66 y.o. male.  1955      Complaints:  Changes to Medical Hx:  -Prior echocardiogram from 3/2019 showed moderate mitral valve regurgitation and mild aortic root dilation-saw cardiologist in February in 1 year follow-up recommended with no imaging mentioned   -Seen on 8/31/21, dx with epididymitis and given bactrim bid x7d. 50% better, but still with some sensitivity and soreness over epididymitis.      Diet: \"eating a lot better\". Red meat < once/wk. Plenty of fruits/veggies. Fish regularly. denies sugary beverages or frequent sweets. \"could work on carbs\"  Exercise: walking several miles or playing tennis/pickball, 7 days/wk- no sob, cp, dizziness, lightheadedness.   T: denies   EtOH: 3d/wk- 2-3 glasses of wine or beer.   D: denies   FHx of Cancer: denies crc or prostate ca.   FHx of CVD: M with stent at 86. F with stent in 80s. MGM with heart dz in 80s.   Lives with: wife and daughter  Work: [employer] mg[employer]   Sexual Activity: monogamous   Colonoscopy: 12/2019 with hyperplastic polyp only in the rectum     Zosta: s/p Shingrix x2  Td/TDap: 2013  Candidate for flu shot today  CMP/LP: Recent labs show stable PSA at 1.17, FBG of 100, A1c of 5.4%, LDL of 67, TC of 179,   HepC: UTD  Had both COVID-19 vaccinations         The following have been reviewed and updated as appropriate in this visit:  Tobacco  Allergies  Meds  Problems  Med Hx  Fam Hx       Review of Systems   Constitutional: Negative for appetite change, chills, diaphoresis, fatigue, fever and unexpected weight change.   HENT: Negative for hearing loss.    Eyes: Negative for visual disturbance (seeing eye dr today for PVD follow up ).   Respiratory: Negative for chest tightness, shortness of breath and wheezing.    Cardiovascular: Negative for chest pain, palpitations (has not had for >1 yr) and leg swelling.   Gastrointestinal: Negative for abdominal pain, anal bleeding, blood in " "stool, constipation (no issues at all with fiber-rich diet), diarrhea, nausea and vomiting.   Genitourinary: Positive for testicular pain (see hpi). Negative for flank pain and hematuria (no recurrent kidney stone sx. ).        Seeing Urologist in 9 days (Dr Dial). Remains concerned about the volume of his ejaculate. No nocturia at all. Stream is intermittently weak.        Skin: Negative for rash.   Neurological: Negative for dizziness, syncope, weakness, light-headedness, numbness and headaches.   Psychiatric/Behavioral: Negative for dysphoric mood and sleep disturbance (using trazodone 3d/wk 50 mg with good effect). The patient is not nervous/anxious (doing very well).        Objective     Vitals:    09/14/21 0806 09/14/21 0836   BP: 120/88 132/80   BP Location: Left upper arm Left upper arm   Patient Position: Sitting Sitting   Pulse: 75    Resp: 18    Temp: 37.1 °C (98.7 °F)    TempSrc: Oral    SpO2: 99%    Weight: 67 kg (147 lb 12.8 oz)    Height: 1.689 m (5' 6.5\")      Body mass index is 23.5 kg/m².    Physical Exam  Constitutional:       General: He is not in acute distress.     Appearance: Normal appearance. He is well-developed and normal weight. He is not ill-appearing, toxic-appearing or diaphoretic.   HENT:      Head: Normocephalic and atraumatic.      Right Ear: Hearing, tympanic membrane, ear canal and external ear normal.      Left Ear: Hearing, tympanic membrane, ear canal and external ear normal.      Mouth/Throat:      Mouth: Mucous membranes are moist.      Pharynx: Oropharynx is clear.   Eyes:      General: No scleral icterus.     Extraocular Movements: Extraocular movements intact.      Pupils: Pupils are equal, round, and reactive to light.   Neck:      Thyroid: No thyromegaly.   Cardiovascular:      Rate and Rhythm: Normal rate and regular rhythm.      Heart sounds: Normal heart sounds. No murmur heard.   No friction rub. No gallop.    Pulmonary:      Effort: Pulmonary effort is normal. " No respiratory distress.      Breath sounds: Normal breath sounds. No stridor. No wheezing, rhonchi or rales.   Abdominal:      General: Bowel sounds are normal. There is no distension.      Palpations: Abdomen is soft. There is no mass.      Tenderness: There is no abdominal tenderness. There is no guarding or rebound.      Hernia: No hernia is present.   Musculoskeletal:      Right lower leg: No edema.      Left lower leg: No edema.   Lymphadenopathy:      Cervical: No cervical adenopathy.   Neurological:      Mental Status: He is alert and oriented to person, place, and time. Mental status is at baseline.      Cranial Nerves: No cranial nerve deficit.   Psychiatric:         Mood and Affect: Mood normal. Mood is not anxious.         Behavior: Behavior normal.         Thought Content: Thought content normal.         Judgment: Judgment normal.         Assessment/Plan   Diagnoses and all orders for this visit:    Preventative health care (Primary)  Comments:  Doing excellently from a healthy lifestyle perspective and he will keep up the great work    Pure hypercholesterolemia    Agatston coronary artery calcium score less than 100  Assessment & Plan:  -no sx from a cardiopulm perspective and LDL excellent at 67- cont mod intensity statin      Moderate mitral regurgitation by prior echocardiogram  Assessment & Plan:  -He has no murmur whatsoever in his mitral region  -He is clinically euvolemic  -His cardiologist did not recommend repeat echocardiogram per the consult note from February, but it has now been over 2 years since this was last assessed with an echo, which would concurrently assess his aortic root  -He will call Dr. Estevez to determine if testing needed now or after next visit      Aortic root dilatation (CMS/HCC)    Benign prostatic hyperplasia with weak urinary stream  Assessment & Plan:  -He is functioning relatively well from a prostate perspective  -He will see his urologist in 2 weeks for a prostate  exam  -His PSA is stable  -If his symptoms become more bothersome, he will discuss potential medication options with his urologist      Elevated fasting blood sugar  Assessment & Plan:  -minimal FBG elev at 100, but a1c fantastic   -he will focus on reduction of carbs and we will repeat next yr      Irritable bowel syndrome with constipation  Assessment & Plan:  -no sx at all with fiber rich diet, which he will continue  -He is up-to-date on colonoscopy, which showed benign hyperplastic polyps only 2 years ago      History of kidney stones  Assessment & Plan:  -He has no symptoms whatsoever and will be seeing his urologist in 2 weeks, so defer UA to that time      Epididymitis, right  Assessment & Plan:  -50% better compared to last visit 2 weeks ago, but still not 100% resolved  -He is being mindful of scrotal support, which I commended him on, but we will add another 5 days of Bactrim for a full 12-day course to see if this resolves his symptoms  -If it does not, he will discuss fluoroquinolone with urologist, but I would like to avoid this for now since he is very active      Need for vaccination for Strep pneumoniae  Comments:  due now since 66- Pneumovax given today    Need for immunization against influenza    Other orders  -     Influenza vaccine Quad Adjuvanted 65 and Older (FluAd Quad)  -     Pneumococcal polysaccharide vaccine 23-valent greater than or equal to 3yo subcutaneous/IM  -     sulfamethoxazole-trimethoprim (BACTRIM DS) 800-160 mg per tablet; Take 1 tablet by mouth 2 (two) times a day for 5 days.

## 2021-09-14 NOTE — ASSESSMENT & PLAN NOTE
-50% better compared to last visit 2 weeks ago, but still not 100% resolved  -He is being mindful of scrotal support, which I commended him on, but we will add another 5 days of Bactrim for a full 12-day course to see if this resolves his symptoms  -If it does not, he will discuss fluoroquinolone with urologist, but I would like to avoid this for now since he is very active

## 2021-09-14 NOTE — ASSESSMENT & PLAN NOTE
-He has no murmur whatsoever in his mitral region  -He is clinically euvolemic  -His cardiologist did not recommend repeat echocardiogram per the consult note from February, but it has now been over 2 years since this was last assessed with an echo, which would concurrently assess his aortic root  -He will call Dr. Estevez to determine if testing needed now or after next visit

## 2021-09-14 NOTE — ASSESSMENT & PLAN NOTE
-He is functioning relatively well from a prostate perspective  -He will see his urologist in 2 weeks for a prostate exam  -His PSA is stable  -If his symptoms become more bothersome, he will discuss potential medication options with his urologist

## 2021-09-14 NOTE — PATIENT INSTRUCTIONS
Please call Dr. Estevez to determine if you are due for your repeat echocardiogram now to follow your mitral valve regurgitation and dilation of your ascending aorta.  It looks like your last imaging was in 2019, so a 2-year repeat would be due now.    Otherwise, restart the Bactrim twice daily for 5 days and if your symptoms are not 100% better after that, please discuss with Dr. Dial.

## 2021-09-14 NOTE — ASSESSMENT & PLAN NOTE
-minimal FBG elev at 100, but a1c fantastic   -he will focus on reduction of carbs and we will repeat next yr

## 2021-09-20 ENCOUNTER — TELEPHONE (OUTPATIENT)
Dept: PRIMARY CARE | Facility: CLINIC | Age: 66
End: 2021-09-20

## 2021-09-20 RX ORDER — LEVOFLOXACIN 500 MG/1
500 TABLET, FILM COATED ORAL DAILY
Qty: 10 TABLET | Refills: 0 | Status: SHIPPED | OUTPATIENT
Start: 2021-09-20 | End: 2021-09-30

## 2021-09-20 NOTE — TELEPHONE ENCOUNTER
Pt said that the 2 rounds of antibiotic has not helped. He would like to get the stronger medication that you spoke about in the previous 2 appts. He is certain it has worked in the past, and he does not want to be in pain anymore, nor come in again for the same problem.

## 2021-09-20 NOTE — TELEPHONE ENCOUNTER
"Called patient.  Asked pt to reiterate history. At beach for two weeks a few weeks ago, testicle pain.  Right testicular pain. Aching sensation  Had TTP on examination by PCP  On bactrim for total 12 days (7 day course initially, then extended addition 5 days)  Still symptomatic.  requesting \"other antibiotic that was discussed at physical\"- fluoroquinolone  Discussed why he was not put on fluoroquinolones initially (bactrim is a good agent - significant adverse effects with fluoroquinolones)  Expressed his frustration about general avoidance of certain meds because of side effects. Expressed frustration about not hearing back sooner. Stated that we do the best we can at addressing patient responses as fast as we can.   Discussed why we are so cautious with fluoroquinolones (tendon rupture, cardiac side effects, delirium, etc). Stated that the risk is low but still there, which is why the medical community strongly advises against it initially.   He doesn't have an appointment with the urologist for a few more weeks, earliest he can get.  Ultimately after extensive discussion, he is willing to take the risk of fluoroquinolones to see if this will help his symptoms, and is aware of what to look out for.  Stated that if he experiences any new symptoms he should call us. If no improvement, may need to consider other diagnostic evaluation, etc.  Sent levofloxain 500mg daily for 10 days to the pharmacy.  Will notify pcp of this discussion.       "

## 2021-09-20 NOTE — TELEPHONE ENCOUNTER
Called and spoke to Pt since neither Of the nurses are here at the moment. I Told him dr JEROME is not in the office today and asked him if I could bring this up to another provider.  He verbalized that he was upset because he is still having the pain, and the other medication dr JEROME proscribed isn't working and wants the other medication dr JEROME mentioned. He said he brought it up at his physical and he just extended the medication that wasn't working.  I apologized for the inconvenience  and told him I would bring it up with dr Johnson the provider that is in the office today

## 2021-09-20 NOTE — TELEPHONE ENCOUNTER
Patient called in again, he is not happy that he did not hear back from the dr today, he would like this issue to be addressed ASAP    He's been in twice for pain and twice he has received the same medication and it has not helped    He would like something else to try

## 2021-09-28 ENCOUNTER — TRANSCRIBE ORDERS (OUTPATIENT)
Dept: SCHEDULING | Age: 66
End: 2021-09-28

## 2021-09-28 ENCOUNTER — HOSPITAL ENCOUNTER (OUTPATIENT)
Dept: RADIOLOGY | Facility: CLINIC | Age: 66
Discharge: HOME | End: 2021-09-28
Attending: UROLOGY
Payer: COMMERCIAL

## 2021-09-28 DIAGNOSIS — N20.0 CALCULUS OF KIDNEY: ICD-10-CM

## 2021-09-28 DIAGNOSIS — N20.0 CALCULUS OF KIDNEY: Primary | ICD-10-CM

## 2021-09-28 DIAGNOSIS — N50.82 SCROTAL PAIN: ICD-10-CM

## 2021-09-28 PROCEDURE — 76775 US EXAM ABDO BACK WALL LIM: CPT

## 2021-09-28 PROCEDURE — 76870 US EXAM SCROTUM: CPT

## 2021-11-05 ENCOUNTER — TELEPHONE (OUTPATIENT)
Dept: PRIMARY CARE | Facility: CLINIC | Age: 66
End: 2021-11-05

## 2021-11-05 NOTE — TELEPHONE ENCOUNTER
He had a PE 9/14/21 and was charged $780.00 he does have [employer] with [employer] . He also turned 66 and he has questions his Medicare      His # [phone]

## 2021-11-08 NOTE — TELEPHONE ENCOUNTER
Spoke with pt regarding bill he received from 09/14/2021 DOS, Per  [employer] with [employer] they had pt listed as medicare primary and [employer]na secondary.  Pt is disputing he should have had A[employer]na primary until 10/01/2021 when Medicare B became active.    Explained I could not assist pt, I did suggest he contact Medicare and [employer]. Pt will not contact Medicare.

## 2022-04-07 ENCOUNTER — APPOINTMENT (OUTPATIENT)
Dept: URBAN - METROPOLITAN AREA CLINIC 200 | Age: 67
Setting detail: DERMATOLOGY
End: 2022-04-08

## 2022-04-07 DIAGNOSIS — L82.1 OTHER SEBORRHEIC KERATOSIS: ICD-10-CM

## 2022-04-07 DIAGNOSIS — Z11.52 ENCOUNTER FOR SCREENING FOR COVID-19: ICD-10-CM

## 2022-04-07 DIAGNOSIS — L57.8 OTHER SKIN CHANGES DUE TO CHRONIC EXPOSURE TO NONIONIZING RADIATION: ICD-10-CM

## 2022-04-07 DIAGNOSIS — L29.8 OTHER PRURITUS: ICD-10-CM

## 2022-04-07 PROCEDURE — OTHER COUNSELING: OTHER

## 2022-04-07 PROCEDURE — OTHER SCREENING FOR COVID-19: OTHER

## 2022-04-07 PROCEDURE — OTHER PRESCRIPTION MEDICATION MANAGEMENT: OTHER

## 2022-04-07 PROCEDURE — OTHER REASSURANCE: OTHER

## 2022-04-07 PROCEDURE — 99213 OFFICE O/P EST LOW 20 MIN: CPT

## 2022-04-07 PROCEDURE — OTHER SUNSCREEN RECOMMENDATIONS: OTHER

## 2022-04-07 ASSESSMENT — LOCATION ZONE DERM
LOCATION ZONE: TRUNK
LOCATION ZONE: NECK
LOCATION ZONE: ARM

## 2022-04-07 ASSESSMENT — LOCATION SIMPLE DESCRIPTION DERM
LOCATION SIMPLE: ABDOMEN
LOCATION SIMPLE: RIGHT FOREARM
LOCATION SIMPLE: GROIN
LOCATION SIMPLE: LEFT UPPER BACK
LOCATION SIMPLE: TRAPEZIAL NECK

## 2022-04-07 ASSESSMENT — PAIN INTENSITY VAS: HOW INTENSE IS YOUR PAIN 0 BEING NO PAIN, 10 BEING THE MOST SEVERE PAIN POSSIBLE?: NO PAIN

## 2022-04-07 ASSESSMENT — LOCATION DETAILED DESCRIPTION DERM
LOCATION DETAILED: MID TRAPEZIAL NECK
LOCATION DETAILED: LEFT MEDIAL UPPER BACK
LOCATION DETAILED: SUPRAPUBIC SKIN
LOCATION DETAILED: RIGHT DISTAL DORSAL FOREARM
LOCATION DETAILED: PERIUMBILICAL SKIN
LOCATION DETAILED: LEFT INFERIOR UPPER BACK

## 2022-04-07 ASSESSMENT — ITCH INTENSITY: HOW SEVERE IS YOUR ITCHING?: 4

## 2022-04-07 NOTE — PROCEDURE: PRESCRIPTION MEDICATION MANAGEMENT
Plan: Recommended: Dr. Bustos- Cervical Spine and Calin Galindo- Spinal Surgeon at [place] Plan: Recommended: Dr. Bustos- Cervical Spine and Calin Galindo- Spinal Surgeon at [place]

## 2022-06-22 ENCOUNTER — TELEPHONE (OUTPATIENT)
Dept: SURGERY | Facility: CLINIC | Age: 67
End: 2022-06-22
Payer: MEDICARE

## 2022-06-22 DIAGNOSIS — M54.2 NECK PAIN: Primary | ICD-10-CM

## 2022-06-22 NOTE — TELEPHONE ENCOUNTER
"Scheduled with lola and need x ray ordered to [facility]    1.  Who referred you to our office? Dr Harlan Márquez       2.  Have you had spine surgery, including a spinal cord stimulator? No   No- Go to question 3.    Yes-  Did you have the surgery with Dr. Oden?  Yes-   Go to question 3.    No- “Because you've had prior surgery, the practice requires you to drop off, or mail, a recent (post-surgery) MRI or CT scan. As a courtesy, Dr. Oden will review the imaging study to determine if he is able to offer surgical options. If Dr. Oden does not feel there is a surgical solution he can offer, his nurse will call you with that information, and you won't need to come into the office. However, I am going to ask you a few more questions to get a better idea of how we can help you.”  Go to question 3.    3. Is your pain related to a worker's compensation injury or a recent (within 2 years) auto accident? No   No- Go to question 4.    Yes- “Unfortunately, Dr. Oden does not accept these insurances”  (No additional questions, please forward the encounter to provider [email or web address])     4. Where is your pain located (neck (cervical), mid (thoracic), or low back (lumbar))? Cervical pain with a weird feeling in hands and fingers and forearm pain   If the patient has multiple areas of pain, explain” I will make a note of this in your chart. However, Dr. Oden will be able to focus on only ONE area at your appointment.”   Go to question 5.    5. Have you completed an MRI or CT scan of your spine within the last 18 months?  No     Yes- \"please bring the discs and reports of these images to your appointment.\"      • Schedule with Dr. Oden  • Go to question 6.     No- \"I can schedule you an appointment with Dr. Akshat Davila's physician assistant.  She evaluates patients who do not have MRIs or CTs to help start a treatment plan. If after your initial visit with Lola, it is appropriate to see Dr. Oden, we " "will schedule accordingly.\"     • Schedule with Lola (make appointment length 60 minutes)  • Go to question 6.     6. Have you completed x-rays of your spine within the last year? No     Yes- please bring the discs and reports of these images to your appointment.  •  Go to question 7.    No- “We will order an x-ray for you, to complete prior to your visit. Are you able to complete the x-rays at a [facility] facility?”    • If patient cannot go to [facility], find out where they will be going so order can be faxed.   • Remind patient to bring the disc of their images, and written report, to their appointment.  • Schedule appointment 7-10 days out to give patient time to complete x-rays.  • Go to question 7.    7.  Does your pain radiate? yes   • Go to question 8.       8. Do you have numbness or tingling? In right hand forefinger and thumb   •  Go to question 9.    9. Do you have weakness? n   • Go to question 10.     10. Have you completed physical therapy? n   • Go to question 11.    11. Have you completed spinal injections? n       Please forward Telephone Encounter to [facility] Provider Pool.    Before disconnecting with the patient, inform them …” Dr. Oden cannot prescribe narcotic pain medication or perform epidural injections. But he can refer you to an injection specialist if that is determined to be the best treatment for you.”    Instruct all patients to arrive 20 minutes prior to appointment for Check In.    If you do not schedule a patient for an appointment,   please give a reason for not scheduling!!  "

## 2022-06-24 ENCOUNTER — HOSPITAL ENCOUNTER (OUTPATIENT)
Dept: RADIOLOGY | Facility: HOSPITAL | Age: 67
Discharge: HOME | End: 2022-06-24
Attending: PHYSICIAN ASSISTANT
Payer: MEDICARE

## 2022-06-24 DIAGNOSIS — M54.2 NECK PAIN: ICD-10-CM

## 2022-06-24 PROCEDURE — 72040 X-RAY EXAM NECK SPINE 2-3 VW: CPT

## 2022-07-11 ENCOUNTER — OFFICE VISIT (OUTPATIENT)
Dept: SURGERY | Facility: CLINIC | Age: 67
End: 2022-07-11
Payer: MEDICARE

## 2022-07-11 VITALS
HEART RATE: 63 BPM | SYSTOLIC BLOOD PRESSURE: 124 MMHG | OXYGEN SATURATION: 99 % | DIASTOLIC BLOOD PRESSURE: 82 MMHG | RESPIRATION RATE: 15 BRPM

## 2022-07-11 DIAGNOSIS — M54.2 NECK PAIN: Primary | ICD-10-CM

## 2022-07-11 DIAGNOSIS — M54.12 CERVICAL RADICULOPATHY: ICD-10-CM

## 2022-07-11 PROCEDURE — 99203 OFFICE O/P NEW LOW 30 MIN: CPT | Performed by: PHYSICIAN ASSISTANT

## 2022-07-11 RX ORDER — ZOLPIDEM TARTRATE 10 MG/1
10 TABLET ORAL NIGHTLY
COMMUNITY
Start: 2022-06-06

## 2022-07-11 NOTE — PATIENT INSTRUCTIONS
Schedule MRI cervical spine - [phone].  Ask for a disc of your images so that you can bring it to the injection specialist.  I will reach out to you with the results of the MRI and names of injection specialist.  Consider meeting with a neurologist about the pain radiating into your head.

## 2022-07-11 NOTE — LETTER
"July 15, 2022     Dawson Ortiz Jr., DO  [address] [address]    Patient: Juan Crespo  YOB: 1955  Date of Visit: 2022      Dear Dr. Ortiz:    Thank you for referring Juan Crespo to me for evaluation. Below are my notes for this consultation.    If you have questions, please do not hesitate to call me. I look forward to following your patient along with you.         Sincerely,        WILBERTO Chaudhary        CC: No Recipients  Lola Liz PA C  7/15/2022  8:17 AM  Signed  NAME: Juan Crespo  : 1955  PCP: Dawson Ortiz Jr., DO    Chief complaint: neck pain    HPI:  67 y.o. male presenting for initial visit with chief complaint of neck pain.  Pain began years ago but has been worse in the last six months. The pain begins in his neck and occasionally radiates up the back of his head to the top of his head.  He also reports an \"odd sensation, not quite numbness\" of his right lateral elbow to forearm to his index finger and thumb.  Pain is worse with movement of neck and improves with stretching, rest.    He has not completed injections.  He has not attended physical therapy.    Denies any francois trauma. Denies fever or chills. Denies fine motor changes such as buttoning of shirt or change in gait.     PAST MEDICAL HISTORY:   No past medical history on file.    MEDICATIONS:  Current Outpatient Medications on File Prior to Visit   Medication Sig Dispense Refill   • ALPRAZolam (XANAX) 0.25 mg tablet PRN     • atorvastatin (LIPITOR) 20 mg tablet 1 tab PO daily     • zolpidem (AMBIEN) 10 mg tablet Take 10 mg by mouth nightly.     • CIALIS 20 mg tablet      • cyanocobalamin (vitamin B-12) 1,000 mcg tablet Take 1 tablet (1,000 mcg total) by mouth daily. 90 tablet 1     No current facility-administered medications on file prior to visit.         PAST SURGICAL HISTORY:  Past Surgical History:   Procedure Laterality Date   • " ADENOIDECTOMY     • BICEPS TENDON REPAIR Left 2018   • FOOT SURGERY Bilateral    • HERNIA REPAIR Left     inguinal   • TONSILLECTOMY     • WRIST SURGERY Left     1st MCP joint       SOCIAL HISTORY:  Social History     Socioeconomic History   • Marital status:      Spouse name: Not on file   • Number of children: Not on file   • Years of education: Not on file   • Highest education level: Not on file   Occupational History   • Not on file   Tobacco Use   • Smoking status: Never Smoker   • Smokeless tobacco: Never Used   Substance and Sexual Activity   • Alcohol use: Not on file   • Drug use: Not on file   • Sexual activity: Not on file   Other Topics Concern   • Not on file   Social History Narrative   • Not on file     Social Determinants of Health     Financial Resource Strain: Not on file   Food Insecurity: Not on file   Transportation Needs: Not on file   Physical Activity: Not on file   Stress: Not on file   Social Connections: Not on file   Intimate Partner Violence: Not on file   Housing Stability: Not on file       ALLERGIES:  Allergies   Allergen Reactions   • No Known Allergies        ROS:   Constitutional:  No fever, chills, night sweats, decreased appetite   HEENT No change in vision, no difficulty hearing, no sore throat, no difficulty swallowing   Cardiovascular:  No chest pain, palpitations, heart murmur   Respiratory:  No SOB, coughing, wheezes/rales/rhonchi   Gastrointestinal:  No nausea, vomiting, abdominal pain   Genitourinary:  No dysuria   Musculoskeletal:  See HPI   Skin:  No rash or erythema   Neurologic:  See HPI   Psychiatric Illness:  No confusion   Hematological/   Lymphatic:  No abnormal bleeding or swollen lymph nodes.    Allergic/Immunologic:  No hay fever and Lupus.      PHYSICAL EXAM:  Visit Vitals  /82 (BP Location: Left upper arm, Patient Position: Sitting)   Pulse 63   Resp 15   SpO2 99%     General:  Well-developed,appears well, no acute distress   HEENT Normocephalic.  "Sclera nonicteric. Negative for masses, asymmetry and tracheal deviation.    Respiratory:  No SOB, no abnormal effort (use of accessory muscles).    CV:  Pulses regular rate.  All extremities warm with brisk capillary refill.   Neurologic:  Alert and oriented to person, place and time.    GI / Abdominal:  Soft. No abdominal masses or tenderness. Nondistended.    Gait & balance No evidence of myelopathic gait.      Cervical  spine range of motion:  -Forward flexion chin to chest  -Extension to 60  -Lateral bend 30 right, 30 left  -Rotation 30 right, 45 left.      There is mild point tenderness with palpation along the right trapezius muscle.  There is no point tenderness with palpation of the cervical paraspinal muscles or his right elbow.    Neurologic:  Upper Extremity Motor Function    Right  Left    Deltoid  5/5  5/5    Bicep  5/5  5/5    Wrist extension  5/5  5/5    Wrist flexion 5/5 5/5   Tricep  5/5  5/5    Finger flexion/  5/5  5/5    Hand intrinsic  5/5  5/5      Sensory: light touch is intact to bilateral upper extremities with reduced sensation in a C6 distribution    IMAGING: I have personally reviewed the images and these are my findings:  XR cervical: Images of the cervical spine reveal straightening of cervical lordosis. There is grade 1 anterolisthesis of C3 on C4. There is disc space narrowing and osteophyte formation from C4 to C7. There is no evidence of fracture or osseous destructive lesions.    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  His symptoms began years ago and flare intermittently.  The pain begins in his posterior neck and radiates up the back of his head to the top of his head.  He also reports an \"odd sensation that is not quite numbness\" beginning at his right lateral elbow into his forearm, index finger, and thumb.  This sensation is relatively constant.  He has followed with a chiropractor in the past.  At today's visit, he " demonstrated tenderness with palpation of his right trapezius muscle.  He has slightly reduced rotational range of motion to the right.  He has mildly reduced sensation of his right index finger and thumb.  His strength is full.  We reviewed his x-ray images, which reveal multilevel degenerative changes in the form of disc space narrowing and osteophyte formation.  His symptoms are multi-factorial in nature, involving degenerative, inflammatory, and myofascial components.  His right arm sensation may be related to C6 nerve root irritation.  I will order an MRI of his cervical spine.  I will call him with the results of this study.  He is interested in meeting with an injection specialist.  I will send him a My Chart message with some recommendations of these specialists.    We briefly discussed the value of meeting with a neurologist in the future to discuss the pain that radiates into his head.  While some of this pain may be cervicogenic in nature, a neurologist would be able to rule out other causes.       Lola Liz PA-C

## 2022-07-11 NOTE — PROGRESS NOTES
"NAME: Juan Crespo  : 1955  PCP: Dawson Ortiz Jr., DO    Chief complaint: neck pain    HPI:  67 y.o. male presenting for initial visit with chief complaint of neck pain.  Pain began years ago but has been worse in the last six months. The pain begins in his neck and occasionally radiates up the back of his head to the top of his head.  He also reports an \"odd sensation, not quite numbness\" of his right lateral elbow to forearm to his index finger and thumb.  Pain is worse with movement of neck and improves with stretching, rest.    He has not completed injections.  He has not attended physical therapy.    Denies any francois trauma. Denies fever or chills. Denies fine motor changes such as buttoning of shirt or change in gait.     PAST MEDICAL HISTORY:   No past medical history on file.    MEDICATIONS:  Current Outpatient Medications on File Prior to Visit   Medication Sig Dispense Refill   • ALPRAZolam (XANAX) 0.25 mg tablet PRN     • atorvastatin (LIPITOR) 20 mg tablet 1 tab PO daily     • zolpidem (AMBIEN) 10 mg tablet Take 10 mg by mouth nightly.     • CIALIS 20 mg tablet      • cyanocobalamin (vitamin B-12) 1,000 mcg tablet Take 1 tablet (1,000 mcg total) by mouth daily. 90 tablet 1     No current facility-administered medications on file prior to visit.         PAST SURGICAL HISTORY:  Past Surgical History:   Procedure Laterality Date   • ADENOIDECTOMY     • BICEPS TENDON REPAIR Left 2018   • FOOT SURGERY Bilateral    • HERNIA REPAIR Left     inguinal   • TONSILLECTOMY     • WRIST SURGERY Left     1st MCP joint       SOCIAL HISTORY:  Social History     Socioeconomic History   • Marital status:      Spouse name: Not on file   • Number of children: Not on file   • Years of education: Not on file   • Highest education level: Not on file   Occupational History   • Not on file   Tobacco Use   • Smoking status: Never Smoker   • Smokeless tobacco: Never Used   Substance and Sexual Activity   • " Alcohol use: Not on file   • Drug use: Not on file   • Sexual activity: Not on file   Other Topics Concern   • Not on file   Social History Narrative   • Not on file     Social Determinants of Health     Financial Resource Strain: Not on file   Food Insecurity: Not on file   Transportation Needs: Not on file   Physical Activity: Not on file   Stress: Not on file   Social Connections: Not on file   Intimate Partner Violence: Not on file   Housing Stability: Not on file       ALLERGIES:  Allergies   Allergen Reactions   • No Known Allergies        ROS:   Constitutional:  No fever, chills, night sweats, decreased appetite   HEENT No change in vision, no difficulty hearing, no sore throat, no difficulty swallowing   Cardiovascular:  No chest pain, palpitations, heart murmur   Respiratory:  No SOB, coughing, wheezes/rales/rhonchi   Gastrointestinal:  No nausea, vomiting, abdominal pain   Genitourinary:  No dysuria   Musculoskeletal:  See HPI   Skin:  No rash or erythema   Neurologic:  See HPI   Psychiatric Illness:  No confusion   Hematological/   Lymphatic:  No abnormal bleeding or swollen lymph nodes.    Allergic/Immunologic:  No hay fever and Lupus.      PHYSICAL EXAM:  Visit Vitals  /82 (BP Location: Left upper arm, Patient Position: Sitting)   Pulse 63   Resp 15   SpO2 99%     General:  Well-developed,appears well, no acute distress   HEENT Normocephalic. Sclera nonicteric. Negative for masses, asymmetry and tracheal deviation.    Respiratory:  No SOB, no abnormal effort (use of accessory muscles).    CV:  Pulses regular rate.  All extremities warm with brisk capillary refill.   Neurologic:  Alert and oriented to person, place and time.    GI / Abdominal:  Soft. No abdominal masses or tenderness. Nondistended.    Gait & balance No evidence of myelopathic gait.      Cervical  spine range of motion:  -Forward flexion chin to chest  -Extension to 60  -Lateral bend 30 right, 30 left  -Rotation 30 right, 45 left.   "    There is mild point tenderness with palpation along the right trapezius muscle.  There is no point tenderness with palpation of the cervical paraspinal muscles or his right elbow.    Neurologic:  Upper Extremity Motor Function    Right  Left    Deltoid  5/5  5/5    Bicep  5/5  5/5    Wrist extension  5/5  5/5    Wrist flexion 5/5 5/5   Tricep  5/5  5/5    Finger flexion/  5/5  5/5    Hand intrinsic  5/5  5/5      Sensory: light touch is intact to bilateral upper extremities with reduced sensation in a C6 distribution    IMAGING: I have personally reviewed the images and these are my findings:  XR cervical: Images of the cervical spine reveal straightening of cervical lordosis. There is grade 1 anterolisthesis of C3 on C4. There is disc space narrowing and osteophyte formation from C4 to C7. There is no evidence of fracture or osseous destructive lesions.    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  His symptoms began years ago and flare intermittently.  The pain begins in his posterior neck and radiates up the back of his head to the top of his head.  He also reports an \"odd sensation that is not quite numbness\" beginning at his right lateral elbow into his forearm, index finger, and thumb.  This sensation is relatively constant.  He has followed with a chiropractor in the past.  At today's visit, he demonstrated tenderness with palpation of his right trapezius muscle.  He has slightly reduced rotational range of motion to the right.  He has mildly reduced sensation of his right index finger and thumb.  His strength is full.  We reviewed his x-ray images, which reveal multilevel degenerative changes in the form of disc space narrowing and osteophyte formation.  His symptoms are multi-factorial in nature, involving degenerative, inflammatory, and myofascial components.  His right arm sensation may be related to C6 nerve root irritation.  I will order an " MRI of his cervical spine.  I will call him with the results of this study.  He is interested in meeting with an injection specialist.  I will send him a My Chart message with some recommendations of these specialists.    We briefly discussed the value of meeting with a neurologist in the future to discuss the pain that radiates into his head.  While some of this pain may be cervicogenic in nature, a neurologist would be able to rule out other causes.       Lola Liz PA-C

## 2022-07-29 ENCOUNTER — HOSPITAL ENCOUNTER (OUTPATIENT)
Dept: RADIOLOGY | Age: 67
Discharge: HOME | End: 2022-07-29
Attending: PHYSICIAN ASSISTANT
Payer: MEDICARE

## 2022-07-29 DIAGNOSIS — M54.12 CERVICAL RADICULOPATHY: ICD-10-CM

## 2022-07-29 DIAGNOSIS — M54.2 NECK PAIN: ICD-10-CM

## 2022-08-09 ENCOUNTER — TELEPHONE (OUTPATIENT)
Dept: SURGERY | Facility: CLINIC | Age: 67
End: 2022-08-09
Payer: MEDICARE

## 2022-08-09 NOTE — TELEPHONE ENCOUNTER
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 We recommended that he follow up with neurology to rule out any other possible causes.  He reached out to [facility] and cannot be seen until March or Sept 2023.  I will discuss getting him into [facility] sooner for this evaluation.    Danny inquired about medical marijuana to help with his pain.  He notes waking nightly around 1:30AM due to pain.  I encouraged him to try medical marijuana and will send him some contacts via My Chart.     He does plan to contact pain management for injections to help with his pain as well.

## 2022-08-09 NOTE — TELEPHONE ENCOUNTER
----- Message from Juan Crespo sent at 8/8/2022  2:02 PM EDT -----  Regarding: MRI results and follow-up  Hi Lola,   Can you please give me a call at your earliest convenience? Danny Crespo, [phone]    Thank you  [email or web address]

## 2022-08-11 ENCOUNTER — TELEPHONE (OUTPATIENT)
Dept: NEUROLOGY | Facility: CLINIC | Age: 67
End: 2022-08-11

## 2022-08-11 NOTE — TELEPHONE ENCOUNTER
Mr Crespo returned Elisabeth's call about getting an appt set up for the end of this month. Please c/b, thank you

## 2022-08-15 ENCOUNTER — OFFICE VISIT (OUTPATIENT)
Dept: NEUROSURGERY | Facility: CLINIC | Age: 67
End: 2022-08-15
Payer: MEDICARE

## 2022-08-15 VITALS
TEMPERATURE: 97.3 F | BODY MASS INDEX: 24.11 KG/M2 | HEIGHT: 66 IN | WEIGHT: 150 LBS | HEART RATE: 72 BPM | OXYGEN SATURATION: 98 % | DIASTOLIC BLOOD PRESSURE: 96 MMHG | SYSTOLIC BLOOD PRESSURE: 161 MMHG | RESPIRATION RATE: 16 BRPM

## 2022-08-15 DIAGNOSIS — M47.812 CERVICAL SPONDYLOSIS: ICD-10-CM

## 2022-08-15 DIAGNOSIS — G95.89 MYELOMALACIA OF CERVICAL CORD (CMS/HCC): Primary | ICD-10-CM

## 2022-08-15 PROCEDURE — 99205 OFFICE O/P NEW HI 60 MIN: CPT | Performed by: NEUROLOGICAL SURGERY

## 2022-08-15 NOTE — PROGRESS NOTES
"    [facility]  Medical Office Building [address], [address]  [place] [address]  Phone: [phone]  Fax: [phone]      Patient ID: Juan Crespo                              : 1955    Visit Date: 8/15/2022    Referring Provider: Lola Liz PA*    Chief Complaint / History of Present Illness:   Juan Crespo is a pleasant 67 y.o. male history of anxiety, HLD seen today as a consult for neck pain and abnormal MRI.  Danny has a history of some degenerative changes in the neck dating back a few years ago when he had an MRI for neck pain. 3-4 months ago he developed increased neck pain from his baseline, which is only mild occasional pain, and he saw his PCP or ordered an Xray.  Not long after his xray he started to notice an itchy, burning, pain in his R forearm just distal the the elbow joint and tingling + numbness in the 1st and 2nd digits in his right hand.  This prompted him to see a PA from Dr. Oden's office of Ortho who ordered MRI cervical. This MRI showed T2 prolongation in the cord at C4-5 suggestive of myelomalacia and he was referred to Neurology.  Neurology could not see him for a number of weeks and he was referred to our office for evaluation.    He presents today with stable to improved neck pain.  If he has a particularly busy day he will have 2-3/10 neck pain by the end of the day but otherwise he feels relatively well in regards to his neck.  He does have intermittent \"scratchy\" and burning pain chadd  Small patch just lateral to the cubital fossa in the right arm which he finds irritating at times but not terribly painful.  He also endorses paresthesias and numbness in the 1st and 2nd digits of the right hand which is less frequent than his forearm pain.  Otherwise he denies any francois weakness, he is walking well without gait imbalance other than one day a few weeks ago where he recalls tripping a few times.  But otherwise no falls or " ambulatory dysfunction.  NO bowel/bladder dysfunction, no new numbness/tinlging.  He is not dropping objects and no change in handwriting.     Allergies:  Allergies   Allergen Reactions   • No Known Allergies        Medications:     Current Outpatient Medications:   •  ALPRAZolam (XANAX) 0.25 mg tablet, PRN, Disp: , Rfl:   •  atorvastatin (LIPITOR) 20 mg tablet, 1 tab PO daily, Disp: , Rfl:   •  CIALIS 20 mg tablet, , Disp: , Rfl:   •  zolpidem (AMBIEN) 10 mg tablet, Take 10 mg by mouth nightly., Disp: , Rfl:   •  cyanocobalamin (vitamin B-12) 1,000 mcg tablet, Take 1 tablet (1,000 mcg total) by mouth daily., Disp: 90 tablet, Rfl: 1     Past Medical History:   Past Medical History:   Diagnosis Date   • Cervical disc disorder    • Hypertension    • Neck pain    • Peripheral neuropathy        Past Surgical History:   Past Surgical History:   Procedure Laterality Date   • ADENOIDECTOMY     • BICEPS TENDON REPAIR Left 2018   • FOOT SURGERY Bilateral    • HERNIA REPAIR Left     inguinal   • TONSILLECTOMY     • WRIST SURGERY Left     1st MCP joint       Family History:  Family History   Problem Relation Age of Onset   • Heart attack Biological Mother 80   • Coronary artery disease Biological Father 80        stents   • No Known Problems Biological Sister    • Coronary artery disease Maternal Grandmother        Social History:  Social History     Socioeconomic History   • Marital status:      Spouse name: Not on file   • Number of children: Not on file   • Years of education: Not on file   • Highest education level: Not on file   Occupational History   • Not on file   Tobacco Use   • Smoking status: Never Smoker   • Smokeless tobacco: Never Used   Substance and Sexual Activity   • Alcohol use: Yes     Comment: socially   • Drug use: Never   • Sexual activity: Defer   Other Topics Concern   • Not on file   Social History Narrative   • Not on file     Social Determinants of Health     Financial Resource Strain: Not on  file   Food Insecurity: Not on file   Transportation Needs: Not on file   Physical Activity: Not on file   Stress: Not on file   Social Connections: Not on file   Intimate Partner Violence: Not on file   Housing Stability: Not on file       Vitals:   Vitals:    08/15/22 1515   BP: (!) 161/96   Pulse: 72   Resp: 16   Temp: 36.3 °C (97.3 °F)   SpO2: 98%       Review of Systems:  A complete 14 point review of systems was conducted and was deemed negative except what was documented in the HPI.    Physical Examination:  Physical Exam   Constitutional: Oriented to person, place, and time. Appears well-developed and well-nourished.   Head: Normocephalic and atraumatic.   Right Ear: External ear normal.   Left Ear: External ear normal.   Nose: Nose normal.   Mouth/Throat: Oropharynx is clear and moist.   Eyes: Conjunctivae and EOM are normal. Pupils are equal, round, and reactive to light. No scleral icterus.   Neck: Normal range of motion.   Cardiovascular: Normal rate.    Pulmonary/Chest: No respiratory distress.   Abdominal: Soft. Exhibits no distension.  Musculoskeletal: Normal range of motion. No edema or tenderness.   Neurological: Oriented to person, place, and time.   Skin: Skin is warm and dry. No rash noted. No erythema.   Psychiatric: Normal mood and affect. Speech is normal and behavior is normal. Thought content normal.     Vitals reviewed.    Neurological Examination:  Neurologic Exam     Mental Status   Oriented to person, place, and time.   Attention: normal.   Speech: speech is normal   Level of consciousness: alert    Cranial Nerves     CN II: Visual fields are full to confrontation.  Pupils are equal and briskly reactive to light.   CN III, IV, VI: Extra-occular muscles are intact  CN V: Facial sensation is intact and equal in V1-V3 distributions bilaterally.   CN VII: Face is symmetric with normal eye closure and smile.  CN VIII: Hearing is normal to rubbing fingers  CN IX, X: Palate elevates  symmetrically. Phonation is normal.  CN XI: Head turning and shoulder shrug are intact  CN XII: Tongue is midline with normal movements and no atrophy.    Sensation ( /2)    Right Left  Right Left   C5 2 2 L2 2 2   C6 2 2 L3 2 2   C7 2 2 L4 2 2   C8 2 2 L5 2 2   T1 2 2 S1 2 2   Patchy Subjective paresthesias in C6/C7 distribution on the right    Motor:     Deltoid Biceps Triceps Wrist ext Finger ext Hand Intrinsics Hip flexion Knee ext Dorsi-  flexion EHL Plantar Flexion   R 5 5 5 5 5 5 5 5 5 5 5   L 5 5 5 5 5 5 5 5 5 5 5     There is no pronator drift. Muscle bulk and tone are normal.     Gait, Coordination, and Reflexes     Reflexes   Reflexes 1+ except as noted.   Right Grimaldo: absent  Left Grimaldo: absent  Right ankle clonus: absent  Left ankle clonus: absent      Data Review:    Lab Results:   Lab Results   Component Value Date    WBC 4.71 09/09/2021    HGB 13.9 09/09/2021    HCT 41.7 09/09/2021    MCV 93.9 09/09/2021     09/09/2021    RDW 13.5 09/09/2021      Lab Results   Component Value Date    GLUCOSE 100 (H) 09/09/2021    BUN 17 09/09/2021     09/09/2021    K 4.8 09/09/2021     09/09/2021    CO2 25 09/09/2021    ANIONGAP 10 09/09/2021        Imaging:   Independent review of all imaging was done by myself as well as review of the radiologists readings and comparison to prior films.       MRI C 7/29: IMPRESSION:  1. Nonspecific straightening of the cervical alignment.  2. Small right paracentral broad-based disc protrusion at C4-5 that deforms the  right hemicord which also shows focal T2 hyperintensity consistent with  myelomalacia from prior acute insult or chronic compression.  3. Multilevel spondylosis with neural foraminal stenoses as described, worst at  C3-4 and C4-5 on the right, C5-6 and C6-7 on the left, and C7-T1 bilaterally  where the exiting right C4 and C5, left C6 and C7 and bilateral C8 nerve roots,  respectively, may be compressed.  4. No high-grade central spinal  stenosis at any level.     COMMENT:     Cervicothoracic alignment: See impression.  Prevertebral soft tissues: Normal in thickness.  Vertebral bodies: Normal in height, with discogenic endplate irregularity,  marrow signal change and marginal spurring at C4-5 through C6-7.  Intervertebral discs: Diffuse loss of signal with moderate to severe loss of  height at C4-5 through C6-7.     Cervical and upper thoracic spinal canal: Mild acquired compromise at C3-4 and  C4-5.  Visualized spinal cord: See impression.  Visualized contents of the posterior fossa: Normal in appearance.     Axial images:  C2-3: Posterior disc bulge effacing the ventral subarachnoid space, while  uncinate and facet hypertrophy causes moderate left neural foraminal stenosis.  C3-4: Posterior disc bulge effacing the ventral subarachnoid space and  infolded ligamentum flavum abutting the dorsal cord, while uncinate and facet  hypertrophy causes moderate to severe right and moderate left neural foraminal  stenosis. See impression.  C4-5: See impression. In addition, uncinate and facet hypertrophy causes  moderate to severe right and moderate left neural foraminal stenosis. See  impression.  C5-6: Posterior disc bulge, eccentric to the left, abutting the ventral  cord, while uncinate hypertrophy causes moderate right and moderate to severe  left neural foraminal stenosis. See impression.  C6-7: Posterior disc-osteophyte complex abutting the ventral cord, while  uncinate hypertrophy causes mild to moderate right and moderate to severe left  neural foraminal stenosis. See impression.  C7-T1: Posterior disc bulge effacing the ventral subarachnoid space, while  facet hypertrophy causes moderate to severe bilateral neural foraminal stenosis.  See impression.     Visualized extraspinal soft tissues: Unremarkable.            Assessment / Plan: In summary, Juan Crespo is a 67 y.o. male seen today for evaluation of cervical spondylosis with cord compression  and myelomalacia particularly at C4-C5.  I had a very lengthy discussion with the patient lasting approximately 70 minutes in which we discussed the MRI findings and the clinical significance.  Currently he appears to be essentially asymptomatic from the cord injury.  He denies any acute significant trauma to the neck I suspect that the myelomalacia is therefore due to slow intermittent microtrauma.  He does have degenerative disc disease both at the C5-C6 and C6-C7 level but does not have the same sort of cord distortion at those levels although there is cord abutment.  A lengthy discussion with him regarding treatment options which include observation with serial imaging versus surgical intervention.  I explained to him that there is a reasonable chance of clinical progression over time.  I also explained that he is at increased risk for acute neurological decline where he to be involved in any sort of accident that resulted in rapid flexion-extension of the cervical spine such as a car accident or fall.  If he opts for nonoperative intervention would recommend repeat MRI of cervical spine in 6 months and follow-up in 6 months.  I discussed various surgical treatment options as well.  I discussed that the goal of surgery is essentially prophylactic treatment to prevent progression as he is essentially asymptomatic.  I explained that the myelomalacia most likely not resolve radiographically.   As for the specifics of surgery I would likely recommend an anterior approach limited to the C4-C5 level.  Explained to him though that he does have disease at C5-C6 and C6-C7 which may progress over time potentially requiring those levels to be treated as well.    The risks, benefits, and alternatives to the procedure were discussed in detail . The risks include but are not limited to: Bleeding, infection, cerebrospinal fluid leakage, adjacent segment degeneration, need for additional surgery, nerve injury leading to new  weakness, sensory loss, bowel, bladder, dysfunction, spinal cord injury, paralysis, vocal cord paralysis, swallowing difficulties, breathing difficulties, need for prolonged intubation, feeding tube, changes in voice quality, cardiac/pulmonary/renal/hepatic event in the perioperative period, allergic response to the anesthetic, death.  There is a risk of positional injury.  There is risk of death.  Reasonable perioperative and postoperative expectations were discussed.  The patient verbalized understanding.    So at this time he wishes to consider his options.  I have encouraged him to get a second opinion.  Patient lives in [facility] and I provided him with the names of several neurosurgeons both at [facility] as well as [facility].  He can certainly follow-up with us if he so desires.        Msesi Marsh MD    This patient's  note has been dictated using speech recognition software. Inadvertent speech recognition errors should be disregarded. Please do not hesitate to call my office for clarifications.    I spent 70 minutes on this date of service performing the following activities: obtaining history, performing examination, documenting, preparing for visit, obtaining / reviewing records, providing counseling and education, independently reviewing study/studies and communicating results.

## 2022-10-11 RX ORDER — LEVOFLOXACIN 500 MG/1
500 TABLET, FILM COATED ORAL DAILY
COMMUNITY
End: 2022-10-12 | Stop reason: SDUPTHER

## 2022-10-12 ENCOUNTER — OFFICE VISIT (OUTPATIENT)
Dept: PRIMARY CARE | Facility: CLINIC | Age: 67
End: 2022-10-12
Payer: MEDICARE

## 2022-10-12 VITALS
SYSTOLIC BLOOD PRESSURE: 136 MMHG | OXYGEN SATURATION: 100 % | TEMPERATURE: 98.2 F | BODY MASS INDEX: 23.78 KG/M2 | HEIGHT: 66 IN | HEART RATE: 64 BPM | RESPIRATION RATE: 17 BRPM | DIASTOLIC BLOOD PRESSURE: 86 MMHG | WEIGHT: 148 LBS

## 2022-10-12 DIAGNOSIS — N45.1 EPIDIDYMITIS, RIGHT: Primary | ICD-10-CM

## 2022-10-12 DIAGNOSIS — R73.01 ELEVATED FASTING BLOOD SUGAR: ICD-10-CM

## 2022-10-12 DIAGNOSIS — E78.00 PURE HYPERCHOLESTEROLEMIA: ICD-10-CM

## 2022-10-12 DIAGNOSIS — I77.810 AORTIC ROOT DILATATION (CMS/HCC): ICD-10-CM

## 2022-10-12 PROBLEM — D64.9 NORMOCYTIC ANEMIA: Status: RESOLVED | Noted: 2020-10-09 | Resolved: 2022-10-12

## 2022-10-12 PROCEDURE — 99214 OFFICE O/P EST MOD 30 MIN: CPT | Performed by: FAMILY MEDICINE

## 2022-10-12 RX ORDER — LEVOFLOXACIN 500 MG/1
500 TABLET, FILM COATED ORAL DAILY
Qty: 10 TABLET | Refills: 0 | Status: SHIPPED | OUTPATIENT
Start: 2022-10-12

## 2022-10-12 RX ORDER — LEVOFLOXACIN 500 MG/1
500 TABLET, FILM COATED ORAL DAILY
Qty: 14 TABLET | Refills: 0 | OUTPATIENT
Start: 2022-10-12 | End: 2022-10-26

## 2022-10-12 ASSESSMENT — ENCOUNTER SYMPTOMS
ABDOMINAL PAIN: 0
NAUSEA: 0
FEVER: 0
CHILLS: 0
VOMITING: 0
DYSURIA: 0

## 2022-10-12 NOTE — ASSESSMENT & PLAN NOTE
- He will be establishing with a new cardiologist closer to where he lives in [facility] soon

## 2022-10-12 NOTE — TELEPHONE ENCOUNTER
"----- Message from Susan Ng MA sent at 10/11/2022 10:49 AM EDT -----  Regarding: FW: Refill Levaquin 500 mg  Contact: [phone]    ----- Message -----  From: Juan Crespo \"Danny\"  Sent: 10/11/2022  10:36 AM EDT  To:  [facility] Clinical Support P  Subject: Refill Levaquin 500 mg                           Ale JEROME.  Can you please refill the medication that was prescribed by Dr. meyer from September 20, 2021, Im having the very same symptoms as I had last September. You first prescribed a medication that didnt work. Once I started the Levaquin It did work and I did not experience any side effects. Im asking for you to refill the prescription because when I called to make an appointment you have nothing in the near future. And keep in mind that we have moved from the [facility] to [facility] so its not easy for me to get in for an appointment.   Last Year I had both testicle & kidney ultrasounds that showed no abnormalities. The Urologist did say this could be a recurring issue. Perhaps you can call me and we can discuss options, [phone].   Thank you,   Danny Crespo     "
Left vm to cb to schedule  
Patient absolutely needs an appointment to discuss this.  I know that he lives far from the office, but I also saw that he recently saw the urologist and they did not think that an antibiotic was needed.  This needs to be discussed in more detail I have not seen him in over a year.  Please have him set up for a same-day appointment with me or any of my partners  
[phone]

## 2022-10-12 NOTE — ASSESSMENT & PLAN NOTE
- Symptoms are exactly consistent with the symptoms he had last year that did not respond to a 12-day course of Bactrim and ultimately needed to be treated with Levaquin 500 mg for 10 days, which did completely alleviate his symptoms for almost an entire year  -However, over the past month, the exact symptoms have recurred  -His urologist did not think this was due to infection, but he does have tenderness over his epididymis today and does have a known hx of retrograde ejaculation, which puts him at risk for infection, so I will have him again trial a 7 to 10-day course of Levaquin 500 mg and if this does not completely alleviate his symptoms, he will see a different urologist, Dr. Bloch, at [facility]    -He is well aware of the potential fluoroquinolone induced tendon/ligament injuries that can occur and he agrees to avoid any heavy lifting or exercise while on the Levaquin and for several days thereafter

## 2022-10-12 NOTE — PROGRESS NOTES
"      Subjective      Patient ID: Danny Crespo is a 67 y.o. male.  1955      Here for testicular pain   - 4 wks   - R sided   - \"general discomfort\"  - causing him to take ibuprofen 600 mg bid   - denies swelling, redness, f/c, dysuria, penile discharge.   - \"exactly the same as last year\" when he received no benefit from 12 d of bactrim, but sx resolved with levofloxacin   - just saw Urologist, Dr Valle, 10/3/22 and was told testicular exam normal with no need for further antibiotics.   - does have known retrograde ejaculation.       The following have been reviewed and updated as appropriate in this visit:   Tobacco  Allergies  Meds  Problems  Med Hx  Surg Hx  Fam Hx       Review of Systems   Constitutional: Negative for chills and fever.   Gastrointestinal: Negative for abdominal pain, nausea and vomiting.   Genitourinary: Positive for testicular pain. Negative for dysuria, penile discharge, penile swelling and scrotal swelling.       Objective     Vitals:    10/12/22 1648   BP: 130/80   BP Location: Left upper arm   Patient Position: Sitting   Pulse: 64   Resp: 17   Temp: 36.8 °C (98.2 °F)   TempSrc: Temporal   SpO2: 100%   Weight: 67.1 kg (148 lb)   Height: 1.676 m (5' 6\")     Body mass index is 23.89 kg/m².    Physical Exam  Constitutional:       General: He is not in acute distress.     Appearance: Normal appearance. He is normal weight. He is not ill-appearing, toxic-appearing or diaphoretic.   HENT:      Head: Normocephalic and atraumatic.   Cardiovascular:      Rate and Rhythm: Normal rate and regular rhythm.      Heart sounds: Normal heart sounds. No murmur heard.    No friction rub. No gallop.   Pulmonary:      Effort: Pulmonary effort is normal. No respiratory distress.      Breath sounds: Normal breath sounds. No stridor. No wheezing, rhonchi or rales.   Abdominal:      General: Bowel sounds are normal. There is no distension.      Palpations: Abdomen is soft. There is no mass.      " Tenderness: There is no abdominal tenderness. There is no guarding or rebound.      Hernia: No hernia is present. There is no hernia in the left inguinal area or right inguinal area.   Genitourinary:     Penis: Normal and circumcised.       Testes: Normal.         Right: Mass, tenderness, swelling, testicular hydrocele or varicocele not present. Right testis is descended. Cremasteric reflex is present.          Left: Mass, tenderness, swelling, testicular hydrocele or varicocele not present. Left testis is descended. Cremasteric reflex is present.       Epididymis:      Right: Not inflamed or enlarged. Tenderness (minor R sided) present. No mass.      Left: Normal. Not inflamed or enlarged. No tenderness.   Musculoskeletal:      Right lower leg: No edema.      Left lower leg: No edema.   Lymphadenopathy:      Lower Body: No right inguinal adenopathy. No left inguinal adenopathy.   Neurological:      Mental Status: He is alert and oriented to person, place, and time. Mental status is at baseline.         Assessment/Plan   Diagnoses and all orders for this visit:    Epididymitis, right (Primary)  Assessment & Plan:  - Symptoms are exactly consistent with the symptoms he had last year that did not respond to a 12-day course of Bactrim and ultimately needed to be treated with Levaquin 500 mg for 10 days, which did completely alleviate his symptoms for almost an entire year  -However, over the past month, the exact symptoms have recurred  -His urologist did not think this was due to infection, but he does have tenderness over his epididymis today and does have a known hx of retrograde ejaculation, which puts him at risk for infection, so I will have him again trial a 7 to 10-day course of Levaquin 500 mg and if this does not completely alleviate his symptoms, he will see a different urologist, Dr. Bloch, at [facility]    -He is well aware of the potential fluoroquinolone induced tendon/ligament injuries that can occur and  he agrees to avoid any heavy lifting or exercise while on the Levaquin and for several days thereafter    Orders:  -     Ambulatory referral to Urology; Future    Aortic root dilatation (CMS/HCC)  Assessment & Plan:  - He will be establishing with a new cardiologist closer to where he lives in [facility] soon      Pure hypercholesterolemia  Assessment & Plan:  - He will have his fasting lipids checked leading up to a physical with me in the next few months unless he establishes with a new PCP closer to him in the meantime    Orders:  -     Lipid panel; Future  -     Comprehensive metabolic panel; Future  -     TSH 3rd Generation; Future  -     CBC and Differential; Future    Elevated fasting blood sugar  -     Comprehensive metabolic panel; Future  -     Hemoglobin A1c; Future    Other orders  -     levoFLOXacin (LEVAQUIN) 500 mg tablet; Take 1 tablet (500 mg total) by mouth daily.

## 2022-10-12 NOTE — ASSESSMENT & PLAN NOTE
- He will have his fasting lipids checked leading up to a physical with me in the next few months unless he establishes with a new PCP closer to him in the meantime

## 2023-02-08 ENCOUNTER — PATIENT OUTREACH (OUTPATIENT)
Dept: CASE MANAGEMENT | Facility: CLINIC | Age: 68
End: 2023-02-08
Payer: MEDICARE

## 2023-02-08 NOTE — PROGRESS NOTES
ISÍAAS #1  Ambulatory Care Manager phoned patient for ISAÍAS initial call. Voicemail left informed of Care Coordinator role, provided contact information, requested call back.  Will  continue to follow for ISAÍAS.   Diana RILEY RN [facility]  Ambulatory Care Manager  [phone]

## 2023-02-09 NOTE — PROGRESS NOTES
ISAÍAS #2 Call to patient, left VM requesting call back.    Diana VALENZUELAN RN [facility]  Ambulatory Care Manager  [phone]

## 2023-02-10 NOTE — PROGRESS NOTES
NAME: Danny Crespo    MRN: [id number]    YOB: 1955    Event Review:    Initial TCM Patient Outreach Date: 02/08/23          Discharge Diagnosis: Cervical spondylosis with myelopathy    Patient readmitted in the last 30 days: No  Discharging Facility: [facility]  Date of Last Admission: 02/06/23  Date of Last Discharge: 02/07/23                                                                          Diana Ignacio RN  [phone]

## 2023-04-20 ENCOUNTER — APPOINTMENT (OUTPATIENT)
Dept: URBAN - METROPOLITAN AREA CLINIC 203 | Age: 68
Setting detail: DERMATOLOGY
End: 2023-04-23

## 2023-04-20 DIAGNOSIS — L57.8 OTHER SKIN CHANGES DUE TO CHRONIC EXPOSURE TO NONIONIZING RADIATION: ICD-10-CM

## 2023-04-20 DIAGNOSIS — D22 MELANOCYTIC NEVI: ICD-10-CM

## 2023-04-20 DIAGNOSIS — L82.1 OTHER SEBORRHEIC KERATOSIS: ICD-10-CM

## 2023-04-20 DIAGNOSIS — L81.4 OTHER MELANIN HYPERPIGMENTATION: ICD-10-CM

## 2023-04-20 DIAGNOSIS — Z71.89 OTHER SPECIFIED COUNSELING: ICD-10-CM

## 2023-04-20 PROBLEM — D22.5 MELANOCYTIC NEVI OF TRUNK: Status: ACTIVE | Noted: 2023-04-20

## 2023-04-20 PROCEDURE — OTHER COUNSELING: OTHER

## 2023-04-20 PROCEDURE — 99213 OFFICE O/P EST LOW 20 MIN: CPT

## 2023-04-20 PROCEDURE — OTHER SUNSCREEN RECOMMENDATIONS: OTHER

## 2023-04-20 ASSESSMENT — LOCATION SIMPLE DESCRIPTION DERM
LOCATION SIMPLE: LEFT UPPER BACK
LOCATION SIMPLE: CHEST
LOCATION SIMPLE: GROIN
LOCATION SIMPLE: LEFT FOREHEAD
LOCATION SIMPLE: RIGHT FOREHEAD

## 2023-04-20 ASSESSMENT — LOCATION DETAILED DESCRIPTION DERM
LOCATION DETAILED: RIGHT INFERIOR MEDIAL FOREHEAD
LOCATION DETAILED: LEFT MEDIAL INFERIOR CHEST
LOCATION DETAILED: LEFT MEDIAL SUPERIOR CHEST
LOCATION DETAILED: LEFT MID-UPPER BACK
LOCATION DETAILED: LEFT MEDIAL FOREHEAD
LOCATION DETAILED: SUPRAPUBIC SKIN

## 2023-04-20 ASSESSMENT — PAIN INTENSITY VAS: HOW INTENSE IS YOUR PAIN 0 BEING NO PAIN, 10 BEING THE MOST SEVERE PAIN POSSIBLE?: NO PAIN

## 2023-04-20 ASSESSMENT — LOCATION ZONE DERM
LOCATION ZONE: TRUNK
LOCATION ZONE: FACE

## 2024-03-20 ENCOUNTER — TELEPHONE (OUTPATIENT)
Dept: PRIMARY CARE | Facility: CLINIC | Age: 69
End: 2024-03-20
Payer: MEDICARE

## 2024-03-26 ENCOUNTER — APPOINTMENT (OUTPATIENT)
Dept: URBAN - METROPOLITAN AREA CLINIC 203 | Age: 69
Setting detail: DERMATOLOGY
End: 2024-03-28

## 2024-03-26 DIAGNOSIS — L81.4 OTHER MELANIN HYPERPIGMENTATION: ICD-10-CM

## 2024-03-26 DIAGNOSIS — L57.8 OTHER SKIN CHANGES DUE TO CHRONIC EXPOSURE TO NONIONIZING RADIATION: ICD-10-CM

## 2024-03-26 DIAGNOSIS — Z71.89 OTHER SPECIFIED COUNSELING: ICD-10-CM

## 2024-03-26 DIAGNOSIS — L72.0 EPIDERMAL CYST: ICD-10-CM

## 2024-03-26 DIAGNOSIS — D22 MELANOCYTIC NEVI: ICD-10-CM

## 2024-03-26 PROBLEM — D22.5 MELANOCYTIC NEVI OF TRUNK: Status: ACTIVE | Noted: 2024-03-26

## 2024-03-26 PROCEDURE — OTHER COUNSELING: OTHER

## 2024-03-26 PROCEDURE — OTHER SUNSCREEN RECOMMENDATIONS: OTHER

## 2024-03-26 PROCEDURE — 99213 OFFICE O/P EST LOW 20 MIN: CPT

## 2024-03-26 ASSESSMENT — LOCATION ZONE DERM
LOCATION ZONE: FACE
LOCATION ZONE: TRUNK
LOCATION ZONE: EYELID

## 2024-03-26 ASSESSMENT — LOCATION DETAILED DESCRIPTION DERM
LOCATION DETAILED: RIGHT INFERIOR LATERAL MIDBACK
LOCATION DETAILED: LEFT MEDIAL INFERIOR CHEST
LOCATION DETAILED: SUPERIOR MID FOREHEAD
LOCATION DETAILED: RIGHT LATERAL INFERIOR EYELID
LOCATION DETAILED: LEFT SUPERIOR MEDIAL UPPER BACK
LOCATION DETAILED: LEFT SUPERIOR UPPER BACK

## 2024-03-26 ASSESSMENT — LOCATION SIMPLE DESCRIPTION DERM
LOCATION SIMPLE: SUPERIOR FOREHEAD
LOCATION SIMPLE: CHEST
LOCATION SIMPLE: RIGHT LOWER BACK
LOCATION SIMPLE: LEFT UPPER BACK
LOCATION SIMPLE: RIGHT INFERIOR EYELID